# Patient Record
Sex: FEMALE | Race: WHITE | NOT HISPANIC OR LATINO | Employment: FULL TIME | URBAN - METROPOLITAN AREA
[De-identification: names, ages, dates, MRNs, and addresses within clinical notes are randomized per-mention and may not be internally consistent; named-entity substitution may affect disease eponyms.]

---

## 2017-07-03 ENCOUNTER — TELEPHONE (OUTPATIENT)
Dept: OBSTETRICS AND GYNECOLOGY | Facility: CLINIC | Age: 42
End: 2017-07-03

## 2017-07-03 ENCOUNTER — LAB VISIT (OUTPATIENT)
Dept: LAB | Facility: OTHER | Age: 42
End: 2017-07-03
Attending: OBSTETRICS & GYNECOLOGY
Payer: COMMERCIAL

## 2017-07-03 ENCOUNTER — PATIENT MESSAGE (OUTPATIENT)
Dept: OBSTETRICS AND GYNECOLOGY | Facility: CLINIC | Age: 42
End: 2017-07-03

## 2017-07-03 DIAGNOSIS — O20.0 THREATENED MISCARRIAGE: ICD-10-CM

## 2017-07-03 DIAGNOSIS — O02.1 MISSED AB: Primary | ICD-10-CM

## 2017-07-03 DIAGNOSIS — O20.0 THREATENED ABORTION: Primary | ICD-10-CM

## 2017-07-03 LAB
ABO + RH BLD: NORMAL
BLD GP AB SCN CELLS X3 SERPL QL: NORMAL
HCG INTACT+B SERPL-ACNC: 95 MIU/ML

## 2017-07-03 PROCEDURE — 84702 CHORIONIC GONADOTROPIN TEST: CPT

## 2017-07-03 PROCEDURE — 86850 RBC ANTIBODY SCREEN: CPT

## 2017-07-03 PROCEDURE — 86900 BLOOD TYPING SEROLOGIC ABO: CPT

## 2017-07-03 NOTE — TELEPHONE ENCOUNTER
Patient was instructed to come in to have blood work done. To check her HCG levels and a type in screen. I advised patient to come in early as possible patient stated that she will come in at 12 noon today. Patient verbalized understanding.

## 2017-07-03 NOTE — TELEPHONE ENCOUNTER
----- Message from Chanel Martell sent at 7/3/2017 11:05 AM CDT -----  Contact: Patient  X _1st Request  _  2nd Request  _  3rd Request    Who:CHRISTINA COSTA [[id number]]    Why:Patient states she had a blood work done and needed to know what was her next step     What Number to Call Back:[phone]    When to Expect a call back: (Before the end of the day)   -- if call after 3:00 call back will be tomorrow.

## 2017-07-03 NOTE — TELEPHONE ENCOUNTER
Called patient to discuss bleeding and HCG 95.  Patient is concerned about ectopic because of her history of endometriosis. Will get repeat lab on Wednesday and have her seen in clinic. She denies severe abdominal pain or heavy bleeding.

## 2017-07-03 NOTE — TELEPHONE ENCOUNTER
----- Message from Nat Velázquez sent at 7/3/2017  8:10 AM CDT -----  Contact: Self  Pt is calling in regards of some heavy bleeding that she is having. The pt states that she took a preg test and it came back positive. LMP 05/29/2017. The pt states that she started having some spotting on Saturday and then yesterday and this morning the bleeding got heavier with some cramping and back pain. The pt can be reached at [phone]. Thanks kG

## 2017-07-03 NOTE — TELEPHONE ENCOUNTER
Patient advised to come Wednesday to do blood work also patient scheduled to see the NP on Wednesday 07/05/17 patient verbalized.

## 2017-07-05 ENCOUNTER — OFFICE VISIT (OUTPATIENT)
Dept: OBSTETRICS AND GYNECOLOGY | Facility: CLINIC | Age: 42
End: 2017-07-05
Payer: COMMERCIAL

## 2017-07-05 ENCOUNTER — LAB VISIT (OUTPATIENT)
Dept: LAB | Facility: OTHER | Age: 42
End: 2017-07-05
Attending: OBSTETRICS & GYNECOLOGY
Payer: COMMERCIAL

## 2017-07-05 VITALS
BODY MASS INDEX: 19.76 KG/M2 | DIASTOLIC BLOOD PRESSURE: 56 MMHG | HEIGHT: 65 IN | SYSTOLIC BLOOD PRESSURE: 92 MMHG | WEIGHT: 118.63 LBS

## 2017-07-05 DIAGNOSIS — O02.1 MISSED AB: Primary | ICD-10-CM

## 2017-07-05 DIAGNOSIS — O20.0 THREATENED ABORTION: ICD-10-CM

## 2017-07-05 LAB — HCG INTACT+B SERPL-ACNC: 35 MIU/ML

## 2017-07-05 PROCEDURE — 99999 PR PBB SHADOW E&M-EST. PATIENT-LVL III: CPT | Mod: PBBFAC,,, | Performed by: NURSE PRACTITIONER

## 2017-07-05 PROCEDURE — 99213 OFFICE O/P EST LOW 20 MIN: CPT | Mod: S$GLB,,, | Performed by: NURSE PRACTITIONER

## 2017-07-05 PROCEDURE — 84702 CHORIONIC GONADOTROPIN TEST: CPT

## 2017-07-05 PROCEDURE — 36415 COLL VENOUS BLD VENIPUNCTURE: CPT

## 2017-07-05 NOTE — PROGRESS NOTES
CC: Missed     HPI: Pt is a 42 y.o.  female who presents as a f/o for threatened AB.  She has had vaginal bleeding for the past 5 days.  Reports VB ranges from spotting to heavy flow.  Reports some associated low back pain and mild cramping.  Denies any fever, sharp abdominal pains, or foul smelling vaginal discharge.  Reports h/o endometriosis and septate uterus which was repaired.   HCG 2 days ago was 95- today the hcg dropped to 35.  ROS:  GENERAL: Feeling well overall. Denies fever or chills.   SKIN: Denies rash or lesions.   HEAD: Denies head injury or headache.   NODES: Denies enlarged lymph nodes.   CHEST: Denies chest pain or shortness of breath.   CARDIOVASCULAR: Denies palpitations or left sided chest pain.   ABDOMEN: No abdominal pain, constipation, diarrhea, nausea, vomiting or rectal bleeding.   URINARY: No dysuria, hematuria, or burning on urination.  REPRODUCTIVE: See HPI.   BREASTS: Denies pain, lumps, or nipple discharge.   HEMATOLOGIC: No easy bruisability or excessive bleeding.   MUSCULOSKELETAL: Denies joint pain or swelling.   NEUROLOGIC: Denies syncope or weakness.   PSYCHIATRIC: Denies depression, anxiety or mood swings.    PE:   APPEARANCE: Well nourished, well developed, Black or  female in no acute distress.  VULVA: No lesions. Normal external female genitalia.  URETHRAL MEATUS: Normal size and location, no lesions, no prolapse.  URETHRA: No masses, tenderness, or prolapse.  VAGINA: Moist. No lesions or lacerations noted. No abnormal discharge present. No odor present.   CERVIX: No lesions or discharge. No cervical motion tenderness.   UTERUS: Normal size, regular shape, mobile, non-tender.  ADNEXA: No tenderness. No fullness or masses palpated in the adnexal regions.   ANUS PERINEUM: Normal.      Diagnosis:  1. Missed         Plan:     Orders Placed This Encounter    hCG, quantitative      Follow up hCG weekly until negative  Discussed to call and RTC if   Has a Fever above 100.4°F or chills, bright red vaginal bleeding that soaks more than 1 pad per hour, or a foul smelling discharge  Discussed Naproxen PRN discomfort.  Discussed the drop in hCG with the VB is consistent with a missed AB  ER precautions reviewed      Cherry Acevedo, FNP-C

## 2017-07-20 ENCOUNTER — LAB VISIT (OUTPATIENT)
Dept: LAB | Facility: OTHER | Age: 42
End: 2017-07-20
Attending: OBSTETRICS & GYNECOLOGY
Payer: COMMERCIAL

## 2017-07-20 DIAGNOSIS — O02.1 MISSED AB: ICD-10-CM

## 2017-07-20 LAB — HCG INTACT+B SERPL-ACNC: <1.2 MIU/ML

## 2017-07-20 PROCEDURE — 84702 CHORIONIC GONADOTROPIN TEST: CPT

## 2017-07-20 PROCEDURE — 36415 COLL VENOUS BLD VENIPUNCTURE: CPT

## 2017-10-16 ENCOUNTER — TELEPHONE (OUTPATIENT)
Dept: OBSTETRICS AND GYNECOLOGY | Facility: CLINIC | Age: 42
End: 2017-10-16

## 2017-10-16 DIAGNOSIS — Z12.31 ENCOUNTER FOR SCREENING MAMMOGRAM FOR MALIGNANT NEOPLASM OF BREAST: Primary | ICD-10-CM

## 2017-10-16 NOTE — TELEPHONE ENCOUNTER
----- Message from Megan Kenny sent at 10/16/2017 12:19 PM CDT -----  Contact: pt  X_  1st Request  _  2nd Request  _  3rd Request      Who:CHRISTINA COSTA [[id number]]    Why: Patient states she would like to get her MMG orders in     What Number to Call Back:[phone]       When to Expect a call back: (Before the end of the day)   -- if call after 3:00 call back will be tomorrow.

## 2017-11-01 ENCOUNTER — OFFICE VISIT (OUTPATIENT)
Dept: DERMATOLOGY | Facility: CLINIC | Age: 42
End: 2017-11-01
Payer: COMMERCIAL

## 2017-11-01 ENCOUNTER — OFFICE VISIT (OUTPATIENT)
Dept: OBSTETRICS AND GYNECOLOGY | Facility: CLINIC | Age: 42
End: 2017-11-01
Attending: OBSTETRICS & GYNECOLOGY
Payer: COMMERCIAL

## 2017-11-01 VITALS — BODY MASS INDEX: 20.47 KG/M2 | WEIGHT: 123 LBS

## 2017-11-01 VITALS
SYSTOLIC BLOOD PRESSURE: 100 MMHG | BODY MASS INDEX: 20.64 KG/M2 | HEIGHT: 65 IN | WEIGHT: 123.88 LBS | DIASTOLIC BLOOD PRESSURE: 60 MMHG

## 2017-11-01 DIAGNOSIS — Z01.419 ENCOUNTER FOR GYNECOLOGICAL EXAMINATION WITHOUT ABNORMAL FINDING: Primary | ICD-10-CM

## 2017-11-01 DIAGNOSIS — D22.9 NEVUS OF MULTIPLE SITES: Primary | ICD-10-CM

## 2017-11-01 DIAGNOSIS — Z78.9 DIET CONTAINS INADEQUATE AMOUNTS OF FOOD: ICD-10-CM

## 2017-11-01 DIAGNOSIS — L73.8 SEBACEOUS GLAND HYPERPLASIA: ICD-10-CM

## 2017-11-01 DIAGNOSIS — D18.00 ANGIOMA: ICD-10-CM

## 2017-11-01 DIAGNOSIS — Z78.9 ADEQUATE NUTRITION: ICD-10-CM

## 2017-11-01 DIAGNOSIS — L73.9 FOLLICULITIS: ICD-10-CM

## 2017-11-01 PROCEDURE — 99999 PR PBB SHADOW E&M-EST. PATIENT-LVL III: CPT | Mod: PBBFAC,,, | Performed by: DERMATOLOGY

## 2017-11-01 PROCEDURE — 99213 OFFICE O/P EST LOW 20 MIN: CPT | Mod: S$GLB,,, | Performed by: DERMATOLOGY

## 2017-11-01 PROCEDURE — 99396 PREV VISIT EST AGE 40-64: CPT | Mod: S$GLB,,, | Performed by: OBSTETRICS & GYNECOLOGY

## 2017-11-01 NOTE — PROGRESS NOTES
"SUBJECTIVE:   42 y.o. female   for annual routine Pap and checkup. Patient's last menstrual period was 10/17/2017..  She reports worsening period cramps. She stopped OCPs- made her haq. She also reports tail bone pain- "feels like bad brusie" since earlier this year. It is worse with sitting. She denies consitipation. She is tearful today- had a miscarriage in July.         Past Medical History:   Diagnosis Date    Endometriosis of pelvis 2005     Past Surgical History:   Procedure Laterality Date    uterus septate  2005     Social History     Social History    Marital status:      Spouse name: N/A    Number of children: N/A    Years of education: N/A     Occupational History    Not on file.     Social History Main Topics    Smoking status: Never Smoker    Smokeless tobacco: Never Used    Alcohol use Yes      Comment: occasionally, 2-3 per week    Drug use: No    Sexual activity: Yes     Partners: Male     Birth control/ protection: Condom     Other Topics Concern    Are You Pregnant Or Think You May Be? No    Breast-Feeding No     Social History Narrative    No narrative on file     Family History   Problem Relation Age of Onset    Breast cancer Paternal Grandmother 80    Melanoma Paternal Grandmother     Melanoma Father     Melanoma Sister     Melanoma Paternal Grandfather     Ovarian cancer Neg Hx      OB History    Para Term  AB Living   3 2 2     2   SAB TAB Ectopic Multiple Live Births                  # Outcome Date GA Lbr Alejandro/2nd Weight Sex Delivery Anes PTL Lv   3             2 Term            1 Term                     Current Outpatient Prescriptions   Medication Sig Dispense Refill    desog-e.estradiol/e.estradiol (KARIVA) 0.15-0.02 mgx21 /0.01 mg x 5 per tablet Take 1 tablet by mouth once daily. 90 tablet 3    acyclovir (ZOVIRAX) 400 MG tablet 1 po TID  For 3-5 days prn fever blisters 60 tablet prn    norethindrone-ethinyl estradiol-iron " (LOESTRIN FE 1.5/30, 28-DAY,) 1.5 mg-30 mcg (21)/75 mg (7) tablet Take 1 tablet by mouth once daily. 90 tablet 0     No current facility-administered medications for this visit.      Allergies: Patient has no known allergies.     ROS:  Constitutional: no weight loss, weight gain, fever, fatigue  Eyes:  No vision changes, glasses/contacts  ENT/Mouth: No ulcers, sinus problems, ears ringing, headache  Cardiovascular: No inability to lie flat, chest pain, exercise intolerance, swelling, heart palpitations  Respiratory: No wheezing, coughing blood, shortness of breath, or cough  Gastrointestinal: No diarrhea, bloody stool, nausea/vomiting, constipation, gas, hemorrhoids  Genitourinary: No blood in urine, painful urination, urgency of urination, frequency of urination, incomplete emptying, incontinence, abnormal bleeding, painful periods, heavy periods, vaginal discharge, vaginal odor, painful intercourse, sexual problems, bleeding after intercourse.  Musculoskeletal: No muscle weakness  Skin/Breast: No painful breasts, nipple discharge, masses, rash, ulcers  Neurological: No passing out, seizures, numbness, headache  Endocrine: No diabetes, hypothyroid, hyperthyroid, hot flashes, hair loss, abnormal hair growth, acne  Psychiatric: No depression, crying  Hematologic: No bruises, bleeding, swollen lymph nodes, anemia.      Physical Exam:   Constitutional: She is oriented to person, place, and time. She appears well-developed and well-nourished.      Neck: Normal range of motion. No tracheal deviation present. No thyromegaly present.    Cardiovascular: Exam reveals no edema.     Pulmonary/Chest: Effort normal. She exhibits no mass, no tenderness, no deformity and no retraction. Right breast exhibits no inverted nipple, no mass, no nipple discharge, no skin change, no tenderness, presence, no bleeding and no swelling. Left breast exhibits no inverted nipple, no mass, no nipple discharge, no skin change, no tenderness,  presence, no bleeding and no swelling. Breasts are symmetrical.        Abdominal: Soft. She exhibits no distension and no mass. There is no tenderness. There is no rebound and no guarding. No hernia. Hernia confirmed negative in the left inguinal area.     Genitourinary: Vagina normal and uterus normal. Rectal exam shows no external hemorrhoid. There is no rash, tenderness or lesion on the right labia. There is no rash, tenderness or lesion on the left labia. Uterus is not deviated. Cervix is normal. No no adexnal prolapse. Right adnexum displays no mass, no tenderness and no fullness. Left adnexum displays no mass, no tenderness and no fullness. No tenderness, bleeding, rectocele, cystocele or unspecified prolapse of vaginal walls in the vagina. No vaginal discharge found. Cervix exhibits no motion tenderness, no discharge and no friability.           Musculoskeletal: Normal range of motion and moves all extremeties. She exhibits no edema.      Lymphadenopathy:        Right: No inguinal adenopathy present.        Left: No inguinal adenopathy present.    Neurological: She is alert and oriented to person, place, and time.    Skin: No rash noted. No erythema. No pallor.    Psychiatric: She has a normal mood and affect. Her behavior is normal. Judgment and thought content normal.         ASSESSMENT:   well woman    PLAN:   mammogram    return annually or prn

## 2017-11-01 NOTE — PROGRESS NOTES
Subjective:       Patient ID:  Zara Leal is a 42 y.o. female who presents for   Chief Complaint   Patient presents with    Lesion     nose     History of Present Illness: The patient presents with chief complaint of lesion in nose.  Location: nose   Duration: a year  Signs/Symptoms: sore to touch    Prior treatments: none    Also would like mole check.           Review of Systems   Constitutional: Negative for fever.   Skin: Negative for itching and rash.   Hematologic/Lymphatic: Does not bruise/bleed easily.        Objective:    Physical Exam   Constitutional: She appears well-developed and well-nourished. No distress.   Neurological: She is alert and oriented to person, place, and time. She is not disoriented.   Psychiatric: She has a normal mood and affect.   Skin:   Areas Examined (abnormalities noted in diagram):   Head / Face Inspection Performed  Neck Inspection Performed  Chest / Axilla Inspection Performed  Abdomen Inspection Performed  Back Inspection Performed  RUE Inspected  LUE Inspection Performed                   Diagram Legend     Erythematous scaling macule/papule c/w actinic keratosis       Vascular papule c/w angioma      Pigmented verrucoid papule/plaque c/w seborrheic keratosis      Yellow umbilicated papule c/w sebaceous hyperplasia      Irregularly shaped tan macule c/w lentigo     1-2 mm smooth white papules consistent with Milia      Movable subcutaneous cyst with punctum c/w epidermal inclusion cyst      Subcutaneous movable cyst c/w pilar cyst      Firm pink to brown papule c/w dermatofibroma      Pedunculated fleshy papule(s) c/w skin tag(s)      Evenly pigmented macule c/w junctional nevus     Mildly variegated pigmented, slightly irregular-bordered macule c/w mildly atypical nevus      Flesh colored to evenly pigmented papule c/w intradermal nevus       Pink pearly papule/plaque c/w basal cell carcinoma      Erythematous hyperkeratotic cursted plaque c/w SCC       "Surgical scar with no sign of skin cancer recurrence      Open and closed comedones      Inflammatory papules and pustules      Verrucoid papule consistent consistent with wart     Erythematous eczematous patches and plaques     Dystrophic onycholytic nail with subungual debris c/w onychomycosis     Umbilicated papule    Erythematous-base heme-crusted tan verrucoid plaque consistent with inflamed seborrheic keratosis     Erythematous Silvery Scaling Plaque c/w Psoriasis     See annotation      Assessment / Plan:        Nevus of multiple sites  The "ABCD" rules to observe pigmented lesions were reviewed.  Brochure provided    Angiomas  reassurance  Brochure provided  Can have laser removal if desired    Folliculitis left nostril    mupirocin calcium 2% nasl oint (BACTROBAN NASAL) 2 % Oint; by Nasal route 2 (two) times daily.  Dispense: 1 g; Refill: 3  Refer for opinion ENT    Sebaceous gland hyperplasia  reassurance      -                Return in about 1 year (around 11/1/2018).  "

## 2017-12-13 ENCOUNTER — TELEPHONE (OUTPATIENT)
Dept: OBSTETRICS AND GYNECOLOGY | Facility: CLINIC | Age: 42
End: 2017-12-13

## 2017-12-13 NOTE — TELEPHONE ENCOUNTER
----- Message from Iveth Wong sent at 12/13/2017  1:14 PM CST -----  Contact: self  x_  1st Request  _  2nd Request  _  3rd Request    Who: pt    Why: pt needs mammo ordered and scheduled.. Please advise    What Number to Call Back: [phone]    When to Expect a call back: (Before the end of the day)   -- if call after 3:00 call back will be tomorrow.

## 2018-02-11 ENCOUNTER — TELEPHONE (OUTPATIENT)
Dept: FAMILY MEDICINE | Facility: CLINIC | Age: 43
End: 2018-02-11

## 2018-02-11 RX ORDER — OSELTAMIVIR PHOSPHATE 75 MG/1
75 CAPSULE ORAL DAILY
Qty: 10 CAPSULE | Refills: 0 | Status: SHIPPED | OUTPATIENT
Start: 2018-02-11 | End: 2018-02-21

## 2018-02-11 RX ORDER — OSELTAMIVIR PHOSPHATE 75 MG/1
75 CAPSULE ORAL DAILY
Qty: 10 CAPSULE | Refills: 0 | Status: SHIPPED | OUTPATIENT
Start: 2018-02-11 | End: 2018-02-11 | Stop reason: SDUPTHER

## 2018-03-20 ENCOUNTER — OFFICE VISIT (OUTPATIENT)
Dept: SPORTS MEDICINE | Facility: CLINIC | Age: 43
End: 2018-03-20
Payer: COMMERCIAL

## 2018-03-20 ENCOUNTER — HOSPITAL ENCOUNTER (OUTPATIENT)
Dept: RADIOLOGY | Facility: HOSPITAL | Age: 43
Discharge: HOME | End: 2018-03-20
Attending: ORTHOPAEDIC SURGERY
Payer: COMMERCIAL

## 2018-03-20 VITALS
DIASTOLIC BLOOD PRESSURE: 66 MMHG | WEIGHT: 123 LBS | HEIGHT: 65 IN | BODY MASS INDEX: 20.49 KG/M2 | SYSTOLIC BLOOD PRESSURE: 105 MMHG | HEART RATE: 82 BPM

## 2018-03-20 DIAGNOSIS — M25.552 BILATERAL HIP PAIN: ICD-10-CM

## 2018-03-20 DIAGNOSIS — M25.512 LEFT SHOULDER PAIN, UNSPECIFIED CHRONICITY: Primary | ICD-10-CM

## 2018-03-20 DIAGNOSIS — R00.2 PALPITATIONS: ICD-10-CM

## 2018-03-20 DIAGNOSIS — M25.512 LEFT SHOULDER PAIN, UNSPECIFIED CHRONICITY: ICD-10-CM

## 2018-03-20 DIAGNOSIS — M25.551 BILATERAL HIP PAIN: ICD-10-CM

## 2018-03-20 PROCEDURE — 73030 X-RAY EXAM OF SHOULDER: CPT | Mod: TC,FY,PO,LT

## 2018-03-20 PROCEDURE — 73030 X-RAY EXAM OF SHOULDER: CPT | Mod: 26,LT,, | Performed by: RADIOLOGY

## 2018-03-20 PROCEDURE — 99999 PR PBB SHADOW E&M-EST. PATIENT-LVL III: CPT | Mod: PBBFAC,,, | Performed by: ORTHOPAEDIC SURGERY

## 2018-03-20 PROCEDURE — 99203 OFFICE O/P NEW LOW 30 MIN: CPT | Mod: S$GLB,,, | Performed by: ORTHOPAEDIC SURGERY

## 2018-03-20 NOTE — PROGRESS NOTES
CC: LEFT shoulder pain      42 y.o. Female with a history of left shoulder pain since beginning of February 2018.  She reports waking up to neck and shoulder pain.  Reports pain with resisted internal rotation. Reports popping with certain movements.  She states that the pain is severe and not responding to any conservative care.      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    She reports that the pain and weakness is worse with overhead activity. It also bothers her at night.    Is affecting ADLs.  Pain is 4/10 at it's worst.      Past Medical History:   Diagnosis Date    Endometriosis of pelvis 12/2005       Past Surgical History:   Procedure Laterality Date    uterus septate  12/2005       Family History   Problem Relation Age of Onset    Breast cancer Paternal Grandmother 80    Melanoma Paternal Grandmother     Melanoma Father     Melanoma Sister     Melanoma Paternal Grandfather     Ovarian cancer Neg Hx          Current Outpatient Prescriptions:     acyclovir (ZOVIRAX) 400 MG tablet, 1 po TID  For 3-5 days prn fever blisters, Disp: 60 tablet, Rfl: prn    mupirocin calcium 2% nasl oint (BACTROBAN NASAL) 2 % Oint, by Nasal route 2 (two) times daily., Disp: 1 g, Rfl: 3    desog-e.estradiol/e.estradiol (KARIVA) 0.15-0.02 mgx21 /0.01 mg x 5 per tablet, Take 1 tablet by mouth once daily., Disp: 90 tablet, Rfl: 3    norethindrone-ethinyl estradiol-iron (LOESTRIN FE 1.5/30, 28-DAY,) 1.5 mg-30 mcg (21)/75 mg (7) tablet, Take 1 tablet by mouth once daily., Disp: 90 tablet, Rfl: 0    Review of patient's allergies indicates:  No Known Allergies       REVIEW OF SYSTEMS:  Constitution: Negative. Negative for chills, fever and night sweats.   HENT: Negative for congestion and headaches.    Eyes: Negative for blurred vision, left vision loss and right vision loss.   Cardiovascular: Negative for chest pain and syncope.   Respiratory: Negative for cough and shortness of breath.    Endocrine: Negative for polydipsia,  "polyphagia and polyuria.   Hematologic/Lymphatic: Negative for bleeding problem. Does not bruise/bleed easily.   Skin: Negative for dry skin, itching and rash.   Musculoskeletal: Negative for falls.  Positive for left shoulder pain and muscle weakness.   Gastrointestinal: Negative for abdominal pain and bowel incontinence.   Genitourinary: Negative for bladder incontinence and nocturia.   Neurological: Negative for disturbances in coordination, loss of balance and seizures.   Psychiatric/Behavioral: Negative for depression. The patient does not have insomnia.    Allergic/Immunologic: Negative for hives and persistent infections.      PHYSICAL EXAMINATION:  Vitals:  /66   Pulse 82   Ht 5' 5" (1.651 m)   Wt 55.8 kg (123 lb)   BMI 20.47 kg/m²    General: The patient is alert and oriented x 3.  Mood is pleasant.  Observation of ears, eyes and nose reveal no gross abnormalities.  No labored breathing observed.  Gait is coordinated. Patient can toe walk and heel walk without difficulty.      LEFT Shoulder / Upper Extremity Exam    OBSERVATION:     Swelling  none  Deformity  none   Discoloration  none   Scapular winging none   Scars   none  Atrophy  none    TENDERNESS / CREPITUS (T/C):          T/C      T/C   Clavicle   -/-  SUPRAspinatus    -/-     AC Jt.    -/-  INFRAspinatus  -/-    SC Jt.    -/-  Deltoid    -/-      G. Tuberosity  -/-  LH BICEP groove  +/-   Acromion:  -/-  Midline Neck   -/-     Scapular Spine -/-  Trapezium   -/-   SMA Scapula  -/-  GH jt. line - post  +/-     Scapulothoracic  -/-         ROM: (* = with pain)  Right shoulder   Left shoulder        AROM (PROM)   AROM (PROM)   FE    170° (175°)     170° (175°)  *   ER at 90° ABD  90°  (90°)    85°  (90°) *   IR (spine level)   T10     BP *    STRENGTH: (* = with pain) Right shoulder   Left shoulder    SCAPTION   5/5    4+/5    IR    5/5    4+/5   ER    5/5    4+/5   BICEPS   5/5    4+/5   Deltoid    5/5    5/5     SIGNS:  Painful " side       NEER   +    OKELLIS  neg    BOGGS   +    SPEEDS  neg     DROP ARM   -   BELLY PRESS neg   Superior escape none    LIFT-OFF  neg   X-Body ADD    neg    MOVING VALGUS neg        STABILITY TESTING    Right shoulder   Left shoulder    Translation     Anterior  up face     up face    Posterior  up face    up face    Sulcus   < 10mm    < 10 mm     Signs   Apprehension   neg      neg       Relocation   no change     no change      Jerk test  neg     neg    EXTREMITY NEURO-VASCULAR EXAM:    Sensation grossly intact to light touch all dermatomal regions.    DTR 2+ Biceps, Triceps, BR and Negative Donovans sign   Grossly intact motor function at Elbow, Wrist and Hand   Distal pulses radial and ulnar 2+, brisk cap refill, symmetric.      NECK:  Painless FROM and spinous processes non-tender. Negative Spurlings sign.      OTHER FINDINGS:  + scapular dyskinesia    XRAYS (3/20/18): Visualized osseous structures appear unremarkable, with no definite evidence of recent or healing fracture, lytic destructive process, appreciable arthritic change, or other significant abnormality identified.  No glenohumeral dislocation.  No abnormal soft tissue calcifications.      ASSESSMENT:    Left shoulder pain  Bilateral hip pain    PLAN:    1. Physical therapy for left shoulder and bilateral hip pain  2. Follow up 2 months    All questions were answered, patient will contact us for questions or concerns in the interim.

## 2018-05-02 PROBLEM — R53.1 WEAKNESS: Status: ACTIVE | Noted: 2018-05-02

## 2018-05-02 PROBLEM — R29.3 POSTURE IMBALANCE: Status: ACTIVE | Noted: 2018-05-02

## 2018-08-16 ENCOUNTER — TELEPHONE (OUTPATIENT)
Dept: FAMILY MEDICINE | Facility: CLINIC | Age: 43
End: 2018-08-16

## 2018-08-16 DIAGNOSIS — R00.2 PALPITATIONS: Primary | ICD-10-CM

## 2018-08-16 PROCEDURE — 93010 ELECTROCARDIOGRAM REPORT: CPT | Mod: S$GLB,,, | Performed by: INTERNAL MEDICINE

## 2018-08-16 PROCEDURE — 93005 ELECTROCARDIOGRAM TRACING: CPT | Mod: S$GLB,,, | Performed by: INTERNAL MEDICINE

## 2018-08-16 RX ORDER — PROPRANOLOL HYDROCHLORIDE 10 MG/1
10 TABLET ORAL EVERY 6 HOURS PRN
Qty: 90 TABLET | Refills: 11 | Status: SHIPPED | OUTPATIENT
Start: 2018-08-16 | End: 2019-08-14 | Stop reason: ALTCHOICE

## 2018-08-16 NOTE — TELEPHONE ENCOUNTER
Telephone call.  Patient has had returned palpitations like she had in 2014.  She has been under stress lately.  It was worse in the past when slightly dehydrated her tired.  She has cut out caffeine.  Shock with sometimes aggravates it.  It has been gone going a few weeks.  She feels her heart rate increases and she gets more of it when she minimally exerts herself around the house.  Yesterday and today she awoke with it that is had some slight tightness in her chest.  No shortness of breath.  Obviously moderately anxious.    Reviewed echo which was normal.  Prior Holter monitor revealed only PVCs and she was seen by EP Cardiology reassured her about the benign nature of her symptoms.  She was offered beta-blocker.  We did discuss probability of being good to have low-dose Inderal to take as needed or possibly even take on a regular basis when symptomatic.    I will have her come in today for EKG.

## 2018-11-20 RX ORDER — ACYCLOVIR 400 MG/1
TABLET ORAL
Qty: 60 TABLET | Refills: 12 | Status: SHIPPED | OUTPATIENT
Start: 2018-11-20 | End: 2021-11-03 | Stop reason: SDUPTHER

## 2019-02-11 RX ORDER — OSELTAMIVIR PHOSPHATE 75 MG/1
75 CAPSULE ORAL DAILY
Qty: 10 CAPSULE | Refills: 0 | Status: SHIPPED | OUTPATIENT
Start: 2019-02-11 | End: 2019-02-21

## 2019-04-04 ENCOUNTER — TELEPHONE (OUTPATIENT)
Dept: FAMILY MEDICINE | Facility: CLINIC | Age: 44
End: 2019-04-04

## 2019-04-04 RX ORDER — VALACYCLOVIR HYDROCHLORIDE 1 G/1
1000 TABLET, FILM COATED ORAL 3 TIMES DAILY
Qty: 21 TABLET | Refills: 0 | Status: SHIPPED | OUTPATIENT
Start: 2019-04-04 | End: 2019-08-14 | Stop reason: ALTCHOICE

## 2019-04-04 NOTE — TELEPHONE ENCOUNTER
She is in [place] taking a class and has been under lot of stress.  She developed a burning sensation in her right abdomen.  Sensitive to touch with close.  She has hima a picture and there is no rash her changes or redness as of yet.  She does have acyclovir for fever blisters.  We discussed the possibility of it being shingles as it does appear to be in a potential dermatomal pattern and has the right nerve type symptoms.  She will take 2 of her acyclovir for 0s today maybe 3 times per day and I will send her in the 7 day course of Valtrex

## 2019-06-14 ENCOUNTER — TELEPHONE (OUTPATIENT)
Dept: OBSTETRICS AND GYNECOLOGY | Facility: CLINIC | Age: 44
End: 2019-06-14

## 2019-06-14 DIAGNOSIS — Z12.31 ENCOUNTER FOR SCREENING MAMMOGRAM FOR MALIGNANT NEOPLASM OF BREAST: Primary | ICD-10-CM

## 2019-06-14 NOTE — TELEPHONE ENCOUNTER
----- Message from Aure Lehman sent at 6/14/2019  9:20 AM CDT -----  Contact: Self            Name of Who is Calling: CHRISTINA COSTA [[id number]]      What is the request in detail: Pt states she needs an order for a mammogram placed in the system. Please contact to further discuss and advise.        Can the clinic reply by [facility]: N      What Number to Call Back [facility] not [facility]: [phone]

## 2019-06-14 NOTE — TELEPHONE ENCOUNTER
Pt called wanting to schedule mmg. Pt states she she has not been in for a while. Pt last seen 11/2017. Advised pt will schedule mmg after annual appointment. Moved pt's appointment from 10/2019 to 8/14/19

## 2019-08-14 ENCOUNTER — OFFICE VISIT (OUTPATIENT)
Dept: OBSTETRICS AND GYNECOLOGY | Facility: CLINIC | Age: 44
End: 2019-08-14
Attending: OBSTETRICS & GYNECOLOGY
Payer: COMMERCIAL

## 2019-08-14 ENCOUNTER — HOSPITAL ENCOUNTER (OUTPATIENT)
Dept: RADIOLOGY | Facility: OTHER | Age: 44
Discharge: HOME | End: 2019-08-14
Attending: OBSTETRICS & GYNECOLOGY
Payer: COMMERCIAL

## 2019-08-14 VITALS
WEIGHT: 126.31 LBS | SYSTOLIC BLOOD PRESSURE: 90 MMHG | HEIGHT: 65 IN | DIASTOLIC BLOOD PRESSURE: 56 MMHG | BODY MASS INDEX: 21.04 KG/M2

## 2019-08-14 DIAGNOSIS — G89.29 CHRONIC PELVIC PAIN IN FEMALE: ICD-10-CM

## 2019-08-14 DIAGNOSIS — R10.2 CHRONIC PELVIC PAIN IN FEMALE: Primary | ICD-10-CM

## 2019-08-14 DIAGNOSIS — G89.29 CHRONIC PELVIC PAIN IN FEMALE: Primary | ICD-10-CM

## 2019-08-14 DIAGNOSIS — Z12.4 SCREENING FOR MALIGNANT NEOPLASM OF CERVIX: ICD-10-CM

## 2019-08-14 DIAGNOSIS — R10.2 CHRONIC PELVIC PAIN IN FEMALE: ICD-10-CM

## 2019-08-14 DIAGNOSIS — Z01.419 ENCOUNTER FOR GYNECOLOGICAL EXAMINATION WITHOUT ABNORMAL FINDING: ICD-10-CM

## 2019-08-14 DIAGNOSIS — Z12.31 ENCOUNTER FOR SCREENING MAMMOGRAM FOR MALIGNANT NEOPLASM OF BREAST: ICD-10-CM

## 2019-08-14 PROCEDURE — 76856 US EXAM PELVIC COMPLETE: CPT | Mod: 26,,, | Performed by: RADIOLOGY

## 2019-08-14 PROCEDURE — 76856 US PELVIS COMP WITH TRANSVAG NON-OB (XPD): ICD-10-PCS | Mod: 26,,, | Performed by: RADIOLOGY

## 2019-08-14 PROCEDURE — 76830 US PELVIS COMP WITH TRANSVAG NON-OB (XPD): ICD-10-PCS | Mod: 26,,, | Performed by: RADIOLOGY

## 2019-08-14 PROCEDURE — 77067 SCR MAMMO BI INCL CAD: CPT | Mod: 26,,, | Performed by: RADIOLOGY

## 2019-08-14 PROCEDURE — 76830 TRANSVAGINAL US NON-OB: CPT | Mod: 26,,, | Performed by: RADIOLOGY

## 2019-08-14 PROCEDURE — 76830 TRANSVAGINAL US NON-OB: CPT | Mod: TC

## 2019-08-14 PROCEDURE — 77067 MAMMO DIGITAL SCREENING BILAT WITH TOMOSYNTHESIS_CAD: ICD-10-PCS | Mod: 26,,, | Performed by: RADIOLOGY

## 2019-08-14 PROCEDURE — 88175 CYTOPATH C/V AUTO FLUID REDO: CPT

## 2019-08-14 PROCEDURE — 77067 SCR MAMMO BI INCL CAD: CPT | Mod: TC

## 2019-08-14 PROCEDURE — 87624 HPV HI-RISK TYP POOLED RSLT: CPT

## 2019-08-14 PROCEDURE — 99396 PR PREVENTIVE VISIT,EST,40-64: ICD-10-PCS | Mod: S$GLB,,, | Performed by: OBSTETRICS & GYNECOLOGY

## 2019-08-14 PROCEDURE — 99396 PREV VISIT EST AGE 40-64: CPT | Mod: S$GLB,,, | Performed by: OBSTETRICS & GYNECOLOGY

## 2019-08-14 PROCEDURE — 77063 MAMMO DIGITAL SCREENING BILAT WITH TOMOSYNTHESIS_CAD: ICD-10-PCS | Mod: 26,,, | Performed by: RADIOLOGY

## 2019-08-14 PROCEDURE — 77063 BREAST TOMOSYNTHESIS BI: CPT | Mod: 26,,, | Performed by: RADIOLOGY

## 2019-08-14 RX ORDER — IBUPROFEN AND FAMOTIDINE 26.6; 8 MG/1; MG/1
TABLET ORAL
COMMUNITY
End: 2019-08-14 | Stop reason: ALTCHOICE

## 2019-08-14 NOTE — PROGRESS NOTES
SUBJECTIVE:   44 y.o. female   for annual routine Pap and checkup. Patient's last menstrual period was 2019..  She reports history of endometriosis.  She took OCPs in the past before having children and did well. The last time she took OCPs after the birth of 1 of her children she reports that made me crazy  She reports having pain occasionally every few days to 1 so weak.  She describes it as a twinge on the right side.  She is concerned that this may be her endometriosis coming back.    She reports fatigue midcycle    Past Medical History:   Diagnosis Date    Endometriosis of pelvis 2005     Past Surgical History:   Procedure Laterality Date    uterus septate  2005     Social History     Socioeconomic History    Marital status:      Spouse name: Not on file    Number of children: Not on file    Years of education: Not on file    Highest education level: Not on file   Occupational History    Not on file   Social Needs    Financial resource strain: Not on file    Food insecurity:     Worry: Not on file     Inability: Not on file    Transportation needs:     Medical: Not on file     Non-medical: Not on file   Tobacco Use    Smoking status: Never Smoker    Smokeless tobacco: Never Used   Substance and Sexual Activity    Alcohol use: Not Currently    Drug use: No    Sexual activity: Yes     Partners: Male     Birth control/protection: Condom   Lifestyle    Physical activity:     Days per week: Not on file     Minutes per session: Not on file    Stress: Not on file   Relationships    Social connections:     Talks on phone: Not on file     Gets together: Not on file     Attends Orthodoxy service: Not on file     Active member of club or organization: Not on file     Attends meetings of clubs or organizations: Not on file     Relationship status: Not on file   Other Topics Concern    Are you pregnant or think you may be? No    Breast-feeding No   Social History Narrative     Not on file     Family History   Problem Relation Age of Onset    Breast cancer Paternal Grandmother 80    Melanoma Paternal Grandmother     Melanoma Father     Melanoma Sister     Melanoma Paternal Grandfather     Ovarian cancer Neg Hx     Colon cancer Neg Hx      OB History    Para Term  AB Living   3 2 2   1 2   SAB TAB Ectopic Multiple Live Births   1              # Outcome Date GA Lbr Alejandro/2nd Weight Sex Delivery Anes PTL Lv   3 SAB            2 Term            1 Term                    Current Outpatient Medications   Medication Sig Dispense Refill    acyclovir (ZOVIRAX) 400 MG tablet 1 po TID  For 3-5 days prn fever blisters 60 tablet 12     No current facility-administered medications for this visit.      Allergies: Patient has no known allergies.     The ASCVD Risk score (Glover SAV Jr., et al., 2013) failed to calculate for the following reasons:    Cannot find a previous HDL lab    Cannot find a previous total cholesterol lab      ROS:  Constitutional: no weight loss, weight gain, fever, +fatigue  Eyes:  No vision changes, glasses/contacts  ENT/Mouth: No ulcers, sinus problems, ears ringing, headache  Cardiovascular: No inability to lie flat, chest pain, exercise intolerance, swelling, heart palpitations  Respiratory: No wheezing, coughing blood, shortness of breath, or cough  Gastrointestinal: No diarrhea, bloody stool, nausea/vomiting, constipation, gas, hemorrhoids  Genitourinary: No blood in urine, painful urination, urgency of urination, frequency of urination, incomplete emptying, incontinence, abnormal bleeding, +painful periods, heavy periods, vaginal discharge, vaginal odor, painful intercourse, sexual problems, bleeding after intercourse.  Musculoskeletal: No muscle weakness  Skin/Breast: No painful breasts, nipple discharge, masses, rash, ulcers  Neurological: No passing out, seizures, numbness, headache  Endocrine: No diabetes, hypothyroid, hyperthyroid, hot flashes, hair  loss, abnormal hair growth, acne  Psychiatric: No depression, crying  Hematologic: No bruises, bleeding, swollen lymph nodes, anemia.      Physical Exam:   Constitutional: She is oriented to person, place, and time. She appears well-developed and well-nourished.      Neck: Normal range of motion. No tracheal deviation present. No thyromegaly present.    Cardiovascular: Exam reveals no edema.     Pulmonary/Chest: Effort normal. She exhibits no mass, no tenderness, no deformity and no retraction. Right breast exhibits no inverted nipple, no mass, no nipple discharge, no skin change, no tenderness, presence, no bleeding and no swelling. Left breast exhibits no inverted nipple, no mass, no nipple discharge, no skin change, no tenderness, presence, no bleeding and no swelling. Breasts are symmetrical.        Abdominal: Soft. She exhibits no distension and no mass. There is no tenderness. There is no rebound and no guarding. No hernia. Hernia confirmed negative in the left inguinal area.     Genitourinary: Vagina normal and uterus normal. Rectal exam shows no external hemorrhoid. There is no rash, tenderness or lesion on the right labia. There is no rash, tenderness or lesion on the left labia. Uterus is not deviated. Cervix is normal. No no adexnal prolapse. Right adnexum displays no mass, no tenderness and no fullness. Left adnexum displays no mass, no tenderness and no fullness. No tenderness, bleeding, rectocele, cystocele or unspecified prolapse of vaginal walls in the vagina. No vaginal discharge found. Cervix exhibits no motion tenderness, no discharge and no friability.           Musculoskeletal: Normal range of motion and moves all extremeties. She exhibits no edema.      Lymphadenopathy:        Right: No inguinal adenopathy present.        Left: No inguinal adenopathy present.    Neurological: She is alert and oriented to person, place, and time.    Skin: No rash noted. No erythema. No pallor.    Psychiatric:  She has a normal mood and affect. Her behavior is normal. Judgment and thought content normal.         ASSESSMENT:   well woman  Pelvic pain  PLAN:   mammogram  pap smear  Counseled her on use of eye checks and magnesium to help with her cramping and her periods  Pelvic ultrasound  Will consider restarting OCPs if pain continues  return annually or prn

## 2019-08-15 ENCOUNTER — TELEPHONE (OUTPATIENT)
Dept: FAMILY MEDICINE | Facility: CLINIC | Age: 44
End: 2019-08-15

## 2019-08-15 ENCOUNTER — TELEPHONE (OUTPATIENT)
Dept: OBSTETRICS AND GYNECOLOGY | Facility: CLINIC | Age: 44
End: 2019-08-15

## 2019-08-15 ENCOUNTER — LAB VISIT (OUTPATIENT)
Dept: LAB | Facility: OTHER | Age: 44
End: 2019-08-15
Payer: COMMERCIAL

## 2019-08-15 DIAGNOSIS — N92.0 MENORRHAGIA WITH REGULAR CYCLE: ICD-10-CM

## 2019-08-15 DIAGNOSIS — N85.8 UTERINE MASS: Primary | ICD-10-CM

## 2019-08-15 DIAGNOSIS — Z00.00 ROUTINE MEDICAL EXAM: ICD-10-CM

## 2019-08-15 DIAGNOSIS — Z00.00 ROUTINE MEDICAL EXAM: Primary | ICD-10-CM

## 2019-08-15 LAB
25(OH)D3+25(OH)D2 SERPL-MCNC: 37 NG/ML (ref 30–96)
ALBUMIN SERPL BCP-MCNC: 4.3 G/DL (ref 3.5–5.2)
ALP SERPL-CCNC: 45 U/L (ref 55–135)
ALT SERPL W/O P-5'-P-CCNC: 14 U/L (ref 10–44)
ANION GAP SERPL CALC-SCNC: 9 MMOL/L (ref 8–16)
APTT BLDCRRT: 32 SEC (ref 21–32)
AST SERPL-CCNC: 18 U/L (ref 10–40)
BASOPHILS # BLD AUTO: 0.04 K/UL (ref 0–0.2)
BASOPHILS NFR BLD: 0.8 % (ref 0–1.9)
BILIRUB SERPL-MCNC: 0.8 MG/DL (ref 0.1–1)
BUN SERPL-MCNC: 11 MG/DL (ref 6–20)
CALCIUM SERPL-MCNC: 9 MG/DL (ref 8.7–10.5)
CHLORIDE SERPL-SCNC: 105 MMOL/L (ref 95–110)
CHOLEST SERPL-MCNC: 163 MG/DL (ref 120–199)
CHOLEST/HDLC SERPL: 2.4 {RATIO} (ref 2–5)
CO2 SERPL-SCNC: 25 MMOL/L (ref 23–29)
CREAT SERPL-MCNC: 0.8 MG/DL (ref 0.5–1.4)
DIFFERENTIAL METHOD: ABNORMAL
EOSINOPHIL # BLD AUTO: 0.1 K/UL (ref 0–0.5)
EOSINOPHIL NFR BLD: 1.6 % (ref 0–8)
ERYTHROCYTE [DISTWIDTH] IN BLOOD BY AUTOMATED COUNT: 13.4 % (ref 11.5–14.5)
EST. GFR  (AFRICAN AMERICAN): >60 ML/MIN/1.73 M^2
EST. GFR  (NON AFRICAN AMERICAN): >60 ML/MIN/1.73 M^2
FERRITIN SERPL-MCNC: 5 NG/ML (ref 20–300)
GLUCOSE SERPL-MCNC: 80 MG/DL (ref 70–110)
HCG INTACT+B SERPL-ACNC: <1.2 MIU/ML
HCT VFR BLD AUTO: 34.8 % (ref 37–48.5)
HDLC SERPL-MCNC: 67 MG/DL (ref 40–75)
HDLC SERPL: 41.1 % (ref 20–50)
HGB BLD-MCNC: 11 G/DL (ref 12–16)
IMM GRANULOCYTES # BLD AUTO: 0.01 K/UL (ref 0–0.04)
IMM GRANULOCYTES NFR BLD AUTO: 0.2 % (ref 0–0.5)
INR PPP: 1 (ref 0.8–1.2)
IRON SERPL-MCNC: 46 UG/DL (ref 30–160)
LDLC SERPL CALC-MCNC: 87.4 MG/DL (ref 63–159)
LYMPHOCYTES # BLD AUTO: 1.5 K/UL (ref 1–4.8)
LYMPHOCYTES NFR BLD: 29.1 % (ref 18–48)
MCH RBC QN AUTO: 26.4 PG (ref 27–31)
MCHC RBC AUTO-ENTMCNC: 31.6 G/DL (ref 32–36)
MCV RBC AUTO: 84 FL (ref 82–98)
MONOCYTES # BLD AUTO: 0.2 K/UL (ref 0.3–1)
MONOCYTES NFR BLD: 4 % (ref 4–15)
NEUTROPHILS # BLD AUTO: 3.2 K/UL (ref 1.8–7.7)
NEUTROPHILS NFR BLD: 64.3 % (ref 38–73)
NONHDLC SERPL-MCNC: 96 MG/DL
NRBC BLD-RTO: 0 /100 WBC
PLATELET # BLD AUTO: 181 K/UL (ref 150–350)
PMV BLD AUTO: 9.1 FL (ref 9.2–12.9)
POTASSIUM SERPL-SCNC: 3.6 MMOL/L (ref 3.5–5.1)
PROT SERPL-MCNC: 6.9 G/DL (ref 6–8.4)
PROTHROMBIN TIME: 11.4 SEC (ref 9–12.5)
RBC # BLD AUTO: 4.16 M/UL (ref 4–5.4)
SATURATED IRON: 9 % (ref 20–50)
SODIUM SERPL-SCNC: 139 MMOL/L (ref 136–145)
T4 FREE SERPL-MCNC: 0.9 NG/DL (ref 0.71–1.51)
TOTAL IRON BINDING CAPACITY: 493 UG/DL (ref 250–450)
TRANSFERRIN SERPL-MCNC: 333 MG/DL (ref 200–375)
TRIGL SERPL-MCNC: 43 MG/DL (ref 30–150)
TSH SERPL DL<=0.005 MIU/L-ACNC: 1.85 UIU/ML (ref 0.4–4)
VIT B12 SERPL-MCNC: 661 PG/ML (ref 210–950)
WBC # BLD AUTO: 5.01 K/UL (ref 3.9–12.7)

## 2019-08-15 PROCEDURE — 83540 ASSAY OF IRON: CPT

## 2019-08-15 PROCEDURE — 84702 CHORIONIC GONADOTROPIN TEST: CPT

## 2019-08-15 PROCEDURE — 85610 PROTHROMBIN TIME: CPT

## 2019-08-15 PROCEDURE — 85730 THROMBOPLASTIN TIME PARTIAL: CPT

## 2019-08-15 PROCEDURE — 82607 VITAMIN B-12: CPT

## 2019-08-15 PROCEDURE — 84443 ASSAY THYROID STIM HORMONE: CPT

## 2019-08-15 PROCEDURE — 80053 COMPREHEN METABOLIC PANEL: CPT

## 2019-08-15 PROCEDURE — 36415 COLL VENOUS BLD VENIPUNCTURE: CPT

## 2019-08-15 PROCEDURE — 85025 COMPLETE CBC W/AUTO DIFF WBC: CPT

## 2019-08-15 PROCEDURE — 84439 ASSAY OF FREE THYROXINE: CPT

## 2019-08-15 PROCEDURE — 82306 VITAMIN D 25 HYDROXY: CPT

## 2019-08-15 PROCEDURE — 80061 LIPID PANEL: CPT

## 2019-08-15 PROCEDURE — 82728 ASSAY OF FERRITIN: CPT

## 2019-08-15 NOTE — TELEPHONE ENCOUNTER
Patient called advising that she will need upcoming removal of endometrial mass.  She was told she might need medical clearance.  I have ordered blood work and otherwise she is currently without any cardiopulmonary or infectious symptoms and she is medically cleared

## 2019-08-15 NOTE — TELEPHONE ENCOUNTER
----- Message from Josiane Brown sent at 8/15/2019  1:45 PM CDT -----  Contact: CHRISTINA COSTA [[id number]]   Name of Who is Calling: CHRISTINA COSTA [[id number]]       What is the request in detail: Patient is returning a call from staff she has questions about her upcoming procedure  .....Please contact to further discuss and advise.     Can the clinic reply by [facility]: yes     What Number to Call Back if not in [facility]: [phone]

## 2019-08-19 ENCOUNTER — PATIENT MESSAGE (OUTPATIENT)
Dept: OBSTETRICS AND GYNECOLOGY | Facility: CLINIC | Age: 44
End: 2019-08-19

## 2019-08-20 LAB
HPV HR 12 DNA CVX QL NAA+PROBE: NEGATIVE
HPV16 AG SPEC QL: NEGATIVE
HPV18 DNA SPEC QL NAA+PROBE: NEGATIVE

## 2019-08-22 ENCOUNTER — OFFICE VISIT (OUTPATIENT)
Dept: OBSTETRICS AND GYNECOLOGY | Facility: CLINIC | Age: 44
End: 2019-08-22
Attending: OBSTETRICS & GYNECOLOGY
Payer: COMMERCIAL

## 2019-08-22 ENCOUNTER — HOSPITAL ENCOUNTER (OUTPATIENT)
Dept: PREADMISSION TESTING | Facility: OTHER | Age: 44
Discharge: HOME | End: 2019-08-22
Attending: OBSTETRICS & GYNECOLOGY
Payer: COMMERCIAL

## 2019-08-22 ENCOUNTER — ANESTHESIA EVENT (OUTPATIENT)
Dept: SURGERY | Facility: OTHER | Age: 44
End: 2019-08-22
Payer: COMMERCIAL

## 2019-08-22 VITALS
OXYGEN SATURATION: 99 % | WEIGHT: 125 LBS | HEART RATE: 74 BPM | SYSTOLIC BLOOD PRESSURE: 91 MMHG | TEMPERATURE: 99 F | DIASTOLIC BLOOD PRESSURE: 52 MMHG | HEIGHT: 65 IN | BODY MASS INDEX: 20.83 KG/M2

## 2019-08-22 VITALS
HEIGHT: 65 IN | BODY MASS INDEX: 21.23 KG/M2 | WEIGHT: 127.44 LBS | SYSTOLIC BLOOD PRESSURE: 108 MMHG | DIASTOLIC BLOOD PRESSURE: 62 MMHG

## 2019-08-22 DIAGNOSIS — G89.29 CHRONIC PELVIC PAIN IN FEMALE: Primary | ICD-10-CM

## 2019-08-22 DIAGNOSIS — R93.89 ABNORMAL ULTRASOUND: ICD-10-CM

## 2019-08-22 DIAGNOSIS — R10.2 CHRONIC PELVIC PAIN IN FEMALE: Primary | ICD-10-CM

## 2019-08-22 DIAGNOSIS — N92.0 MENORRHAGIA WITH REGULAR CYCLE: ICD-10-CM

## 2019-08-22 PROCEDURE — 99214 PR OFFICE/OUTPT VISIT, EST, LEVL IV, 30-39 MIN: ICD-10-PCS | Mod: S$GLB,,, | Performed by: OBSTETRICS & GYNECOLOGY

## 2019-08-22 PROCEDURE — 3008F PR BODY MASS INDEX (BMI) DOCUMENTED: ICD-10-PCS | Mod: CPTII,S$GLB,, | Performed by: OBSTETRICS & GYNECOLOGY

## 2019-08-22 PROCEDURE — 99214 OFFICE O/P EST MOD 30 MIN: CPT | Mod: S$GLB,,, | Performed by: OBSTETRICS & GYNECOLOGY

## 2019-08-22 PROCEDURE — 3008F BODY MASS INDEX DOCD: CPT | Mod: CPTII,S$GLB,, | Performed by: OBSTETRICS & GYNECOLOGY

## 2019-08-22 RX ORDER — FAMOTIDINE 20 MG/1
20 TABLET, FILM COATED ORAL
Status: CANCELLED | OUTPATIENT
Start: 2019-08-22 | End: 2019-08-22

## 2019-08-22 RX ORDER — SODIUM CHLORIDE, SODIUM LACTATE, POTASSIUM CHLORIDE, CALCIUM CHLORIDE 600; 310; 30; 20 MG/100ML; MG/100ML; MG/100ML; MG/100ML
INJECTION, SOLUTION INTRAVENOUS CONTINUOUS
Status: CANCELLED | OUTPATIENT
Start: 2019-08-22

## 2019-08-22 RX ORDER — ACETAMINOPHEN 500 MG
1000 TABLET ORAL
Status: CANCELLED | OUTPATIENT
Start: 2019-08-22 | End: 2019-08-22

## 2019-08-22 RX ORDER — PREGABALIN 75 MG/1
75 CAPSULE ORAL
Status: CANCELLED | OUTPATIENT
Start: 2019-08-22 | End: 2019-08-22

## 2019-08-22 NOTE — H&P (VIEW-ONLY)
"She has heavy menses.           History & Physical            OBGYN    [facility] Pre-op Exam      HPI : Zara Leal is a 44 y.o. female  for evaluation and discussion of treatment options for Pre-op Exam   She has pain with her cycles and has had heavy bleeding. .   She has a history of surgery for a septate uterus  Past Medical History:   Diagnosis Date    Endometriosis of pelvis 2005     Past Surgical History:   Procedure Laterality Date    uterus septate  2005     Family History   Problem Relation Age of Onset    Breast cancer Paternal Grandmother 80    Melanoma Paternal Grandmother     Melanoma Father     Melanoma Sister     Melanoma Paternal Grandfather     Ovarian cancer Neg Hx     Colon cancer Neg Hx      Social History     Tobacco Use    Smoking status: Never Smoker    Smokeless tobacco: Never Used   Substance Use Topics    Alcohol use: Not Currently    Drug use: No     OB History    Para Term  AB Living   3 2 2   1 2   SAB TAB Ectopic Multiple Live Births   1              # Outcome Date GA Lbr Alejandro/2nd Weight Sex Delivery Anes PTL Lv   3 SAB            2 Term            1 Term                /62   Ht 5' 5" (1.651 m)   Wt 57.8 kg (127 lb 6.8 oz)   LMP 2019   BMI 21.20 kg/m²     ROS:    GENERAL: Denies weight gain or weight loss. Feeling well overall.   SKIN: Denies rash or lesions.   HEAD: Denies head injury or headache.   NODES: Denies enlarged lymph nodes.   CHEST: Denies chest pain or shortness of breath.   CARDIOVASCULAR: Denies palpitations or left sided chest pain.   ABDOMEN: No abdominal pain, constipation, diarrhea, nausea, vomiting or rectal bleeding.   URINARY: No frequency, dysuria, hematuria, or burning on urination.  REPRODUCTIVE: See HPI.   BREASTS: The patient performs breast self-examination and denies pain, lumps, or nipple discharge.   HEMATOLOGIC: No easy bruisability or excessive bleeding with the exception of menstrual " cycles.  MUSCULOSKELETAL: Denies joint pain or swelling.   NEUROLOGIC: Denies syncope or weakness.   PSYCHIATRIC: Denies depression, anxiety or mood swings.    PHYSICAL EXAM:    APPEARANCE: Well nourished, well developed, in no acute distress.  AFFECT: WNL, alert and oriented x 3  SKIN: No acne or hirsutism  NECK: Neck symmetric without masses or thyromegaly  NODES: No inguinal, cervical, axillary, or femoral lymph node enlargement  CHEST: Good respiratory effect  ABDOMEN: Soft.  No tenderness or masses.  No hepatosplenomegaly.  No hernias.  PELVIC: deferred   EXTREMITIES: No edema.    EXAMINATION:  US PELVIS COMP WITH TRANSVAG NON-OB (XPD)    CLINICAL HISTORY:  Pelvic and perineal pain    TECHNIQUE:  Transabdominal pelvic sonography was performed. To better visualize the ovaries and uterus transvaginal scanning was performed.    COMPARISON:  Pelvic ultrasound 11/30/2011    FINDINGS:  The uterus measures 9.0 x 4.3 x 5.2cm and demonstrates a 1.7 x 0.6 x 1.1 cm hyperechoic solid structure associated with the fundal endometrial cavity.  Elsewhere, the endometrial thickness is 3mm.  There is no endometrial fluid. The uterus is otherwise unremarkable.    The right ovary was not definitively identified..    The left ovary measures 3.9 x 2.2 x 3.5 cm and demonstrates a 2.2 cm simple cyst.  There is intact vascular flow to the left ovary.    There is no free fluid in the visualized pelvis.      Impression       1.  1.7 cm solid structure associated with the fundal endometrial cavity.  Differential includes a submucosal fibroid, endometrial polyp, endometrial hyperplasia, or endometrial carcinoma.  Further evaluation is recommended.    2.  Right ovary not visualized.  This is most likely secondary to bowel gas artifact.    3.  2.2 cm simple left ovarian cyst/follicle.         ASSESSMENT & PLAN:    Pelvic pain  Menorrhagia  Abnormal ultrasound finding    I have discussed the risks, benefits, indications, and alternatives of  the procedure in detail.  The patient verbalizes her understanding.  All questions answered.  Consents signed.  The patient agrees to proceed to proceed as planned.

## 2019-08-22 NOTE — ANESTHESIA PREPROCEDURE EVALUATION
08/22/2019  Zara Leal is a 44 y.o., female.    Anesthesia Evaluation    I have reviewed the Patient Summary Reports.    I have reviewed the Nursing Notes.   I have reviewed the Medications.     Review of Systems  Anesthesia Hx:  No problems with previous Anesthesia  Denies Family Hx of Anesthesia complications.   Denies Personal Hx of Anesthesia complications.   Social:  Non-Smoker    Hematology/Oncology:     Oncology Normal    -- Anemia:   Cardiovascular:  Cardiovascular Normal     Pulmonary:  Pulmonary Normal    Renal/:  Renal/ Normal     Hepatic/GI:  Hepatic/GI Normal    Musculoskeletal:  Musculoskeletal Normal    Neurological:  Neurology Normal    Endocrine:  Endocrine Normal        Physical Exam  General:  Well nourished    Airway/Jaw/Neck:  Airway Findings: Mouth Opening: Normal Tongue: Normal  General Airway Assessment: Adult  Mallampati: I  TM Distance: Normal, at least 6 cm         Dental:  Dental Findings: In tact, Lower retainer             Anesthesia Plan  Type of Anesthesia, risks & benefits discussed:  Anesthesia Type:  general  Patient's Preference:   Intra-op Monitoring Plan: standard ASA monitors  Intra-op Monitoring Plan Comments:   Post Op Pain Control Plan: multimodal analgesia and per primary service following discharge from PACU  Post Op Pain Control Plan Comments:   Induction:   IV  Beta Blocker:         Informed Consent: Patient understands risks and agrees with Anesthesia plan.  Questions answered. Anesthesia consent signed with patient.  ASA Score: 1     Day of Surgery Review of History & Physical:    H&P update referred to the surgeon.         Ready For Surgery From Anesthesia Perspective.

## 2019-08-22 NOTE — DISCHARGE INSTRUCTIONS
PRE-ADMIT TESTING -  [phone]    [address]  [place] [facility]          Your surgery has been scheduled at [facility]. We are pleased to have the opportunity to serve you. For Further Information please call [phone].    On the day of surgery please report to the Information Desk on the 1st floor.    · CONTACT YOUR PHYSICIAN'S OFFICE THE DAY PRIOR TO YOUR SURGERY TO OBTAIN YOUR ARRIVAL TIME.     · The evening before surgery do not eat anything after 9 p.m. ( this includes hard candy, chewing gum and mints).  You may only have GATORADE, POWERADE AND WATER  from 9 p.m. until you leave your home.   DO NOT DRINK ANY LIQUIDS ON THE WAY TO THE HOSPITAL.      SPECIAL MEDICATION INSTRUCTIONS: TAKE medications checked off by the Anesthesiologist on your Medication List.    Angiogram Patients: Take medications as instructed by your physician, including aspirin.     Surgery Patients:    If you take ASPIRIN - Your PHYSICIAN/SURGEON will need to inform you IF/OR when you need to stop taking aspirin prior to your surgery.     Do Not take any medications containing IBUPROFEN.  Do Not Wear any make-up or dark nail polish   (especially eye make-up) to surgery. If you come to surgery with makeup on you will be required to remove the makeup or nail polish.    Do not shave your surgical area at least 5 days prior to your surgery. The surgical prep will be performed at the hospital according to Infection Control regulations.    Leave all valuables at home.   Do Not wear any jewelry or watches, including any metal in body piercings. Jewelry must be removed prior to coming to the hospital.  There is a possibility that rings that are unable to be removed may be cut off if they are on the surgical extremity.    Contact Lens must be removed before surgery. Either do not wear the contact lens or bring a case and solution for storage.  Please bring a container for eyeglasses or dentures as required.  Bring  any paperwork your physician has provided, such as consent forms,  history and physicals, doctor's orders, etc.   Bring comfortable clothes that are loose fitting to wear upon discharge. Take into consideration the type of surgery being performed.  Maintain your diet as advised per your physician the day prior to surgery.      Adequate rest the night before surgery is advised.   Park in the Parking lot behind the hospital or in the [facility] across the street from the parking lot. Parking is complimentary.  If you will be discharged the same day as your procedure, please arrange for a responsible adult to drive you home or to accompany you if traveling by taxi.   YOU WILL NOT BE PERMITTED TO DRIVE OR TO LEAVE THE HOSPITAL ALONE AFTER SURGERY.   It is strongly recommended that you arrange for someone to remain with you for the first 24 hrs following your surgery.    The Surgeon will speak to your family/visitor after your surgery regarding the outcome of your surgery and post op care.  The Surgeon may speak to you after your surgery, but there is a possibility you may not remember the details.  Please check with your family members regarding the conversation with the Surgeon.    We strongly recommend whoever is bringing you home be present for discharge instructions.  This will ensure a thorough understanding for your post op home care.      Thank you for your cooperation.  The Staff of [facility].                Bathing Instructions with Hibiclens     Shower the evening before and morning of your procedure with Hibiclens:   Wash your face with water and your regular face wash/soap   Apply Hibiclens directly on your skin or on a wet washcloth and wash gently. When showering: Move away from the shower stream when applying Hibiclens to avoid rinsing off too soon.   Rinse thoroughly with warm water   Do not dilute Hibiclens         Dry off as usual, do not use any deodorant,  powder, body lotions, perfume, after shave or cologne.

## 2019-08-28 ENCOUNTER — ANESTHESIA (OUTPATIENT)
Dept: SURGERY | Facility: OTHER | Age: 44
End: 2019-08-28
Payer: COMMERCIAL

## 2019-08-28 ENCOUNTER — HOSPITAL ENCOUNTER (OUTPATIENT)
Facility: OTHER | Age: 44
Discharge: HOME | End: 2019-08-28
Attending: OBSTETRICS & GYNECOLOGY | Admitting: OBSTETRICS & GYNECOLOGY
Payer: COMMERCIAL

## 2019-08-28 VITALS
TEMPERATURE: 98 F | OXYGEN SATURATION: 100 % | DIASTOLIC BLOOD PRESSURE: 62 MMHG | SYSTOLIC BLOOD PRESSURE: 112 MMHG | WEIGHT: 125 LBS | BODY MASS INDEX: 20.83 KG/M2 | RESPIRATION RATE: 16 BRPM | HEIGHT: 65 IN | HEART RATE: 76 BPM

## 2019-08-28 DIAGNOSIS — G89.29 CHRONIC PELVIC PAIN IN FEMALE: ICD-10-CM

## 2019-08-28 DIAGNOSIS — R93.89 ABNORMAL ULTRASOUND: ICD-10-CM

## 2019-08-28 DIAGNOSIS — Z98.890 STATUS POST HYSTEROSCOPY: Primary | ICD-10-CM

## 2019-08-28 DIAGNOSIS — R10.2 CHRONIC PELVIC PAIN IN FEMALE: ICD-10-CM

## 2019-08-28 DIAGNOSIS — N92.0 MENORRHAGIA WITH REGULAR CYCLE: ICD-10-CM

## 2019-08-28 LAB
B-HCG UR QL: NEGATIVE
CTP QC/QA: YES

## 2019-08-28 PROCEDURE — 71000015 HC POSTOP RECOV 1ST HR: Performed by: OBSTETRICS & GYNECOLOGY

## 2019-08-28 PROCEDURE — 63600175 PHARM REV CODE 636 W HCPCS: Performed by: ANESTHESIOLOGY

## 2019-08-28 PROCEDURE — 36000707: Performed by: OBSTETRICS & GYNECOLOGY

## 2019-08-28 PROCEDURE — 37000008 HC ANESTHESIA 1ST 15 MINUTES: Performed by: OBSTETRICS & GYNECOLOGY

## 2019-08-28 PROCEDURE — 58558 HYSTEROSCOPY BIOPSY: CPT | Mod: ,,, | Performed by: OBSTETRICS & GYNECOLOGY

## 2019-08-28 PROCEDURE — 27201423 OPTIME MED/SURG SUP & DEVICES STERILE SUPPLY: Performed by: OBSTETRICS & GYNECOLOGY

## 2019-08-28 PROCEDURE — 37000009 HC ANESTHESIA EA ADD 15 MINS: Performed by: OBSTETRICS & GYNECOLOGY

## 2019-08-28 PROCEDURE — 88305 TISSUE EXAM BY PATHOLOGIST: CPT | Mod: 26,,, | Performed by: PATHOLOGY

## 2019-08-28 PROCEDURE — C1782 MORCELLATOR: HCPCS | Performed by: OBSTETRICS & GYNECOLOGY

## 2019-08-28 PROCEDURE — 71000033 HC RECOVERY, INTIAL HOUR: Performed by: OBSTETRICS & GYNECOLOGY

## 2019-08-28 PROCEDURE — 88305 TISSUE SPECIMEN TO PATHOLOGY - SURGERY: ICD-10-PCS | Mod: 26,,, | Performed by: PATHOLOGY

## 2019-08-28 PROCEDURE — 88305 TISSUE EXAM BY PATHOLOGIST: CPT | Performed by: PATHOLOGY

## 2019-08-28 PROCEDURE — 81025 URINE PREGNANCY TEST: CPT | Performed by: ANESTHESIOLOGY

## 2019-08-28 PROCEDURE — 36000706: Performed by: OBSTETRICS & GYNECOLOGY

## 2019-08-28 PROCEDURE — 63600175 PHARM REV CODE 636 W HCPCS: Performed by: NURSE ANESTHETIST, CERTIFIED REGISTERED

## 2019-08-28 PROCEDURE — 58558 PR HYSTEROSCOPY,W/ENDO BX: ICD-10-PCS | Mod: ,,, | Performed by: OBSTETRICS & GYNECOLOGY

## 2019-08-28 PROCEDURE — 25000003 PHARM REV CODE 250: Performed by: NURSE ANESTHETIST, CERTIFIED REGISTERED

## 2019-08-28 PROCEDURE — 25000003 PHARM REV CODE 250: Performed by: ANESTHESIOLOGY

## 2019-08-28 RX ORDER — IBUPROFEN 600 MG/1
600 TABLET ORAL 3 TIMES DAILY
Qty: 30 TABLET | Refills: 0 | Status: ON HOLD | OUTPATIENT
Start: 2019-08-28 | End: 2023-06-02 | Stop reason: HOSPADM

## 2019-08-28 RX ORDER — DIPHENHYDRAMINE HYDROCHLORIDE 50 MG/ML
25 INJECTION INTRAMUSCULAR; INTRAVENOUS EVERY 6 HOURS PRN
Status: DISCONTINUED | OUTPATIENT
Start: 2019-08-28 | End: 2019-08-28 | Stop reason: HOSPADM

## 2019-08-28 RX ORDER — MEPERIDINE HYDROCHLORIDE 25 MG/ML
12.5 INJECTION INTRAMUSCULAR; INTRAVENOUS; SUBCUTANEOUS ONCE AS NEEDED
Status: DISCONTINUED | OUTPATIENT
Start: 2019-08-28 | End: 2019-08-28 | Stop reason: HOSPADM

## 2019-08-28 RX ORDER — HYDROMORPHONE HYDROCHLORIDE 2 MG/ML
0.4 INJECTION, SOLUTION INTRAMUSCULAR; INTRAVENOUS; SUBCUTANEOUS EVERY 5 MIN PRN
Status: DISCONTINUED | OUTPATIENT
Start: 2019-08-28 | End: 2019-08-28 | Stop reason: HOSPADM

## 2019-08-28 RX ORDER — HYDROCODONE BITARTRATE AND ACETAMINOPHEN 5; 325 MG/1; MG/1
1 TABLET ORAL EVERY 6 HOURS PRN
Qty: 7 TABLET | Refills: 0 | Status: ON HOLD | OUTPATIENT
Start: 2019-08-28 | End: 2023-06-02 | Stop reason: HOSPADM

## 2019-08-28 RX ORDER — MIDAZOLAM HYDROCHLORIDE 1 MG/ML
INJECTION, SOLUTION INTRAMUSCULAR; INTRAVENOUS
Status: DISCONTINUED | OUTPATIENT
Start: 2019-08-28 | End: 2019-08-28

## 2019-08-28 RX ORDER — OXYCODONE HYDROCHLORIDE 5 MG/1
5 TABLET ORAL
Status: DISCONTINUED | OUTPATIENT
Start: 2019-08-28 | End: 2019-08-28 | Stop reason: HOSPADM

## 2019-08-28 RX ORDER — PREGABALIN 75 MG/1
75 CAPSULE ORAL
Status: COMPLETED | OUTPATIENT
Start: 2019-08-28 | End: 2019-08-28

## 2019-08-28 RX ORDER — PROPOFOL 10 MG/ML
VIAL (ML) INTRAVENOUS
Status: DISCONTINUED | OUTPATIENT
Start: 2019-08-28 | End: 2019-08-28

## 2019-08-28 RX ORDER — FENTANYL CITRATE 50 UG/ML
INJECTION, SOLUTION INTRAMUSCULAR; INTRAVENOUS
Status: DISCONTINUED | OUTPATIENT
Start: 2019-08-28 | End: 2019-08-28

## 2019-08-28 RX ORDER — LIDOCAINE HCL/PF 100 MG/5ML
SYRINGE (ML) INTRAVENOUS
Status: DISCONTINUED | OUTPATIENT
Start: 2019-08-28 | End: 2019-08-28

## 2019-08-28 RX ORDER — DIPHENHYDRAMINE HCL 25 MG
25 CAPSULE ORAL EVERY 4 HOURS PRN
Status: DISCONTINUED | OUTPATIENT
Start: 2019-08-28 | End: 2019-08-28 | Stop reason: HOSPADM

## 2019-08-28 RX ORDER — SODIUM CHLORIDE 0.9 % (FLUSH) 0.9 %
3 SYRINGE (ML) INJECTION
Status: DISCONTINUED | OUTPATIENT
Start: 2019-08-28 | End: 2019-08-28 | Stop reason: HOSPADM

## 2019-08-28 RX ORDER — DEXAMETHASONE SODIUM PHOSPHATE 4 MG/ML
INJECTION, SOLUTION INTRA-ARTICULAR; INTRALESIONAL; INTRAMUSCULAR; INTRAVENOUS; SOFT TISSUE
Status: DISCONTINUED | OUTPATIENT
Start: 2019-08-28 | End: 2019-08-28

## 2019-08-28 RX ORDER — IBUPROFEN 600 MG/1
600 TABLET ORAL EVERY 6 HOURS PRN
Status: DISCONTINUED | OUTPATIENT
Start: 2019-08-28 | End: 2019-08-28 | Stop reason: HOSPADM

## 2019-08-28 RX ORDER — GLYCOPYRROLATE 0.2 MG/ML
INJECTION INTRAMUSCULAR; INTRAVENOUS
Status: DISCONTINUED | OUTPATIENT
Start: 2019-08-28 | End: 2019-08-28

## 2019-08-28 RX ORDER — ONDANSETRON 2 MG/ML
4 INJECTION INTRAMUSCULAR; INTRAVENOUS DAILY PRN
Status: DISCONTINUED | OUTPATIENT
Start: 2019-08-28 | End: 2019-08-28 | Stop reason: HOSPADM

## 2019-08-28 RX ORDER — ACETAMINOPHEN 500 MG
1000 TABLET ORAL
Status: COMPLETED | OUTPATIENT
Start: 2019-08-28 | End: 2019-08-28

## 2019-08-28 RX ORDER — ONDANSETRON 2 MG/ML
INJECTION INTRAMUSCULAR; INTRAVENOUS
Status: DISCONTINUED | OUTPATIENT
Start: 2019-08-28 | End: 2019-08-28

## 2019-08-28 RX ORDER — HYDROCODONE BITARTRATE AND ACETAMINOPHEN 5; 325 MG/1; MG/1
1 TABLET ORAL EVERY 4 HOURS PRN
Status: DISCONTINUED | OUTPATIENT
Start: 2019-08-28 | End: 2019-08-28 | Stop reason: HOSPADM

## 2019-08-28 RX ORDER — DIPHENHYDRAMINE HYDROCHLORIDE 50 MG/ML
25 INJECTION INTRAMUSCULAR; INTRAVENOUS EVERY 4 HOURS PRN
Status: DISCONTINUED | OUTPATIENT
Start: 2019-08-28 | End: 2019-08-28 | Stop reason: HOSPADM

## 2019-08-28 RX ORDER — ONDANSETRON 8 MG/1
8 TABLET, ORALLY DISINTEGRATING ORAL EVERY 8 HOURS PRN
Status: DISCONTINUED | OUTPATIENT
Start: 2019-08-28 | End: 2019-08-28 | Stop reason: HOSPADM

## 2019-08-28 RX ORDER — FAMOTIDINE 20 MG/1
20 TABLET, FILM COATED ORAL
Status: COMPLETED | OUTPATIENT
Start: 2019-08-28 | End: 2019-08-28

## 2019-08-28 RX ORDER — SODIUM CHLORIDE, SODIUM LACTATE, POTASSIUM CHLORIDE, CALCIUM CHLORIDE 600; 310; 30; 20 MG/100ML; MG/100ML; MG/100ML; MG/100ML
INJECTION, SOLUTION INTRAVENOUS CONTINUOUS
Status: DISCONTINUED | OUTPATIENT
Start: 2019-08-28 | End: 2019-08-28 | Stop reason: HOSPADM

## 2019-08-28 RX ADMIN — DEXAMETHASONE SODIUM PHOSPHATE 8 MG: 4 INJECTION, SOLUTION INTRAMUSCULAR; INTRAVENOUS at 10:08

## 2019-08-28 RX ADMIN — LIDOCAINE HYDROCHLORIDE 50 MG: 20 INJECTION, SOLUTION INTRAVENOUS at 10:08

## 2019-08-28 RX ADMIN — SODIUM CHLORIDE, SODIUM LACTATE, POTASSIUM CHLORIDE, AND CALCIUM CHLORIDE: 600; 310; 30; 20 INJECTION, SOLUTION INTRAVENOUS at 10:08

## 2019-08-28 RX ADMIN — FENTANYL CITRATE 100 MCG: 50 INJECTION, SOLUTION INTRAMUSCULAR; INTRAVENOUS at 10:08

## 2019-08-28 RX ADMIN — PHENYLEPHRINE HYDROCHLORIDE 100 MCG: 10 INJECTION INTRAVENOUS at 10:08

## 2019-08-28 RX ADMIN — GLYCOPYRROLATE 0.2 MG: 0.2 INJECTION, SOLUTION INTRAMUSCULAR; INTRAVENOUS at 10:08

## 2019-08-28 RX ADMIN — ACETAMINOPHEN 1000 MG: 500 TABLET, FILM COATED ORAL at 08:08

## 2019-08-28 RX ADMIN — MIDAZOLAM 2 MG: 1 INJECTION INTRAMUSCULAR; INTRAVENOUS at 10:08

## 2019-08-28 RX ADMIN — SODIUM CHLORIDE, SODIUM LACTATE, POTASSIUM CHLORIDE, AND CALCIUM CHLORIDE: 600; 310; 30; 20 INJECTION, SOLUTION INTRAVENOUS at 09:08

## 2019-08-28 RX ADMIN — PROPOFOL 200 MG: 10 INJECTION, EMULSION INTRAVENOUS at 10:08

## 2019-08-28 RX ADMIN — FAMOTIDINE 20 MG: 20 TABLET, FILM COATED ORAL at 08:08

## 2019-08-28 RX ADMIN — ONDANSETRON 4 MG: 2 INJECTION INTRAMUSCULAR; INTRAVENOUS at 10:08

## 2019-08-28 RX ADMIN — PREGABALIN 75 MG: 75 CAPSULE ORAL at 08:08

## 2019-08-28 NOTE — DISCHARGE INSTRUCTIONS
Home Care Instruction D&C Hysteroscopy             ACTIVITY LEVEL:  If you received sedation and/or an anesthetic, you may feel sleepy for several hours. Rest until you feel more  awake. Gradually resume your normal activities.    DIET:  At home, continue with liquids. If there is no nausea, you may eat a soft diet and gradually resume a regular diet.    BATHING:  You may shower  as desired in one day.  You should avoid tub baths, hot tubs and swimming pools until seen by your physician for a post-op follow up.    CARE:  You can expect watery or bloody vaginal discharge for several days. Do not use tampons, please only use pads. Sexual activity is restricted as advised by your doctor.    MEDICATIONS:  You will receive instructions for any pain and/or antibiotic prescriptions. Pain medication should be taken only if needed and as directed. Antibiotics, if ordered, should be taken as directed until the entire prescription is completed.    WHEN TO CALL THE DOCTOR:   For any heavy vaginal bleeding   Fever over 101°F (38.4°C)   Any lower abdominal pain not relieved by the pain mediation    FOR EMERGENCIES:  If any unusual problems or difficulties occur, contact Dr. Chamberlain or the resident at [phone] (page ) or the Clinic office, [phone].      Anesthesia: After Your Surgery  Youve just had surgery. During surgery, you received medication called anesthesia to keep you comfortable and pain-free. After surgery, you may experience some pain or nausea. This is common. Here are some tips for feeling better and recovering after surgery.    Going home  Your doctor or nurse will show you how to take care of yourself when you go home. He or she will also answer your questions. Have an adult family member or friend drive you home. For the first 24 hours after your surgery:    · Do not drive or use heavy equipment.  · Do not make important decisions or sign legal documents.  · Avoid alcohol.  · Have someone  stay with you, if needed. He or she can watch for problems and help keep you safe.  · Take your time getting up from a seated or lying position. You may experience dizziness for 24 hours.    Be sure to keep all follow-up appointments with your doctor. And rest after your procedure for as long as your doctor tells you to.    Coping with pain  If you have pain after surgery, pain medication will help you feel better. Take it as directed, before pain becomes severe. Also, ask your doctor or pharmacist about other ways to control pain, such as with heat, ice, and relaxation. And follow any other instructions your surgeon or nurse gives you.    URINARY RETENTION  Should you experience a decrease in your urine output or are unable to urinate following surgery, this can be due to the medications given during surgery.  We recommend you going to the nearest Emergency Department.    Tips for taking pain medication  To get the best relief possible, remember these points:    · Pain medications can upset your stomach. Taking them with a little food may help.  · Most pain relievers taken by mouth need at least 20 to 30 minutes to take effect.  · Taking medication on a schedule can help you remember to take it. Try to time your medication so that you can take it before beginning an activity, such as dressing, walking, or sitting down for dinner.  · Constipation is a common side effect of pain medications. Contact your doctor before taking any medications like laxatives or stool softeners to help relieve constipation. Also ask about any dietary restrictions, because drinking lots of fluids and eating foods like fruits and vegetables that are high in fiber can also help. Remember, dont take laxatives unless your surgeon has prescribed them.  · Mixing alcohol and pain medication can cause dizziness and slow your breathing. It can even be fatal. Dont drink alcohol while taking pain medication.  · Pain medication can slow your  reflexes. Dont drive or operate machinery while taking pain medication.    If your health care provider tells you to take acetaminophen to help relieve your pain, ask him or her how much you are supposed to take each day. (Acetaminophen is the generic name for Tylenol and other brand-name pain relievers.) Acetaminophen or other pain relievers may interact with your prescription medicines or other over-the-counter (OTC) drugs. Some prescription medications contain acetaminophen along with other active ingredients. Using both prescription and OTC acetaminophen for pain can cause you to overdose. The FDA recommends that you read the labels on your OTC medications carefully. This will help you to clearly understand the list of active ingredients, dosing instructions, and any warnings. It may also help you avoid taking too much acetaminophen. If you have questions or don't understand the information, ask your pharmacist or health care provider to explain it to you before you take the OTC medication.    Managing nausea  Some people have an upset stomach after surgery. This is often due to anesthesia, pain, pain medications, or the stress of surgery. The following tips will help you manage nausea and get good nutrition as you recover. If you were on a special diet before surgery, ask your doctor if you should follow it during recovery. These tips may help:    · Dont push yourself to eat. Your body will tell you when to eat and how much.  · Start off with clear liquids and soup. They are easier to digest.  · Progress to semi-solid foods (mashed potatoes, applesauce, and gelatin) as you feel ready.  · Slowly move to solid foods. Dont eat fatty, rich, or spicy foods at first.  · Dont force yourself to have three large meals a day. Instead, eat smaller amounts more often.  · Take pain medications with a small amount of solid food, such as crackers or toast to avoid nausea.      Call your surgeon if    · You feel too  sleepy, dizzy, or groggy (medication may be too strong).  · You have side effects like nausea, vomiting, or skin changes (rash, itching, or hives).     © 7577-5349 [employer]. [address] [place] [address], [place] [address]. All rights reserved. This information is not intended as a substitute for professional medical care. Always follow your healthcare professional's instructions.    PLEASE FOLLOW ANY OTHER INSTRUCTIONS PROVIDED TO YOU BY DR. CLAIRE!

## 2019-08-28 NOTE — INTERVAL H&P NOTE
The patient has been examined and the H&P has been reviewed:    I concur with the findings and no changes have occurred since H&P was written.    Anesthesia/Surgery risks, benefits and alternative options discussed and understood by patient/family.          Active Hospital Problems    Diagnosis  POA    Chronic pelvic pain in female [R10.2, G89.29]  Yes      Resolved Hospital Problems   No resolved problems to display.

## 2019-08-28 NOTE — ANESTHESIA POSTPROCEDURE EVALUATION
Anesthesia Post Evaluation    Patient: Zara Leal    Procedure(s) Performed: Procedure(s) (LRB):  HYSTEROSCOPY, WITH DILATION AND CURETTAGE OF UTERUS (N/A)    Final Anesthesia Type: general  Patient location during evaluation: PACU  Patient participation: Yes- Able to Participate  Level of consciousness: awake and alert  Post-procedure vital signs: reviewed and stable  Pain management: adequate  Airway patency: patent  PONV status at discharge: No PONV  Anesthetic complications: no      Cardiovascular status: blood pressure returned to baseline and stable  Respiratory status: unassisted, spontaneous ventilation and room air  Hydration status: euvolemic  Follow-up not needed.          Vitals Value Taken Time   /73 8/28/2019 12:50 PM   Temp 36.4 °C (97.6 °F) 8/28/2019 12:20 PM   Pulse 74 8/28/2019 12:50 PM   Resp 16 8/28/2019 12:50 PM   SpO2 100 % 8/28/2019 12:50 PM         Event Time     Out of Recovery 12:11:00          Pain/Alexandre Score: Pain Rating Prior to Med Admin: 0 (8/28/2019  8:39 AM)  Alexandre Score: 10 (8/28/2019 12:50 PM)

## 2019-08-28 NOTE — TRANSFER OF CARE
"Anesthesia Transfer of Care Note    Patient: Zara Leal    Procedure(s) Performed: Procedure(s) (LRB):  HYSTEROSCOPY, WITH DILATION AND CURETTAGE OF UTERUS (N/A)    Patient location: PACU    Anesthesia Type: general    Transport from OR: Transported from OR on 2-3 L/min O2 by NC with adequate spontaneous ventilation    Post pain: adequate analgesia    Post assessment: no apparent anesthetic complications    Post vital signs: stable    Level of consciousness: awake and alert    Nausea/Vomiting: no nausea/vomiting    Complications: none    Transfer of care protocol was followed      Last vitals:   Visit Vitals  /60 (BP Location: Right arm, Patient Position: Lying)   Pulse 79   Temp 36.4 °C (97.5 °F) (Oral)   Resp 16   Ht 5' 5" (1.651 m)   Wt 56.7 kg (125 lb)   LMP 08/06/2019   SpO2 100%   Breastfeeding? No   BMI 20.80 kg/m²     "

## 2019-08-28 NOTE — PLAN OF CARE
Zara Titi Leal has met all discharge criteria from Phase II. Vital Signs are stable, ambulating  without difficulty. Discharge instructions given, patient verbalized understanding. Discharged from facility via wheelchair in stable condition.

## 2019-08-28 NOTE — DISCHARGE SUMMARY
[facility]  Brief Operative Note     SUMMARY     Surgery Date: 8/28/2019     Surgeon(s) and Role:     * Maryana Chamberlain MD - Primary    Assisting Surgeon: Marline De La O MD - First Assist     Pre-op Diagnosis:  Uterine mass [N85.9]  Menorrhagia with regular cycle [N92.0]    Post-op Diagnosis:  Post-Op Diagnosis Codes:     * Uterine mass [N85.9]     * Menorrhagia with regular cycle [N92.0]    Procedure(s) (LRB):  HYSTEROSCOPY, WITH DILATION AND CURETTAGE OF UTERUS (N/A)    Anesthesia: General    Findings:   - Normal appearing external genitalia  - Uterus sounded to 10 cm, dilated without difficulty to easily accommodate the hysteroscope  - Hysteroscopically, normal endometrium  - Posterior submucosal polyp removed with mini shaver  - Endometrial curettage performed   - Scrapings sent to pathology       Estimated Blood Loss: minimal          Specimens:   Specimen (12h ago, onward)    Start     Ordered    08/28/19 1059  Specimen to Pathology - Surgery  Once     Comments:  1. ENDOMETRIAL CURETTAGE     Start Status     08/28/19 1059 Collected (08/28/19 1058) Order ID: [id number]       08/28/19 1058          Discharge Note    SUMMARY     Admit Date: 8/28/2019    Discharge Date and Time:  08/28/2019 11:13 AM    Hospital Course (synopsis of major diagnoses, care, treatment, and services provided during the course of the hospital stay): Patient presented to outpatient surgery for scheduled hysteroscopy with D&C. Risks/benefits/alternatives were previously discussed with patient in the office and consents were signed.  Patient was taken back to the OR where the procedure was performed without difficulties. Patient placed in recovery in stable condition and ready for discharge.     Final Diagnosis: Post-Op Diagnosis Codes:     * Uterine mass [N85.9]     * Menorrhagia with regular cycle [N92.0]    Disposition: Home or Self Care    Follow Up/Patient Instructions:     Medications:  Reconciled Home  Medications:      Medication List      START taking these medications    HYDROcodone-acetaminophen 5-325 mg per tablet  Commonly known as:  NORCO  Take 1 tablet by mouth every 6 (six) hours as needed for Pain.        CHANGE how you take these medications    * ADVIL ORAL  Take by mouth as needed.  What changed:  Another medication with the same name was added. Make sure you understand how and when to take each.     * ibuprofen 600 MG tablet  Commonly known as:  ADVIL,MOTRIN  Take 1 tablet (600 mg total) by mouth 3 (three) times daily.  What changed:  You were already taking a medication with the same name, and this prescription was added. Make sure you understand how and when to take each.         * This list has 2 medication(s) that are the same as other medications prescribed for you. Read the directions carefully, and ask your doctor or other care provider to review them with you.            CONTINUE taking these medications    acyclovir 400 MG tablet  Commonly known as:  ZOVIRAX  1 po TID  For 3-5 days prn fever blisters          Discharge Procedure Orders   Diet general     Other restrictions (specify):   Order Comments: Notify physician if you soak through a pad an hour for 2 consecutive hours. Advise pelvic rest for 2 weeks (sex, douching, tampons, etc).     Call MD for:  extreme fatigue     Call MD for:  persistent dizziness or light-headedness     Call MD for:  hives     Call MD for:  redness, tenderness, or signs of infection (pain, swelling, redness, odor or green/yellow discharge around incision site)     Call MD for:  difficulty breathing, headache or visual disturbances     Call MD for:  severe uncontrolled pain     Call MD for:  persistent nausea and vomiting     Call MD for:  temperature >100.4     Activity as tolerated     Follow-up Information     Maryana Chamberlain MD In 2 weeks.    Specialties:  Obstetrics, Obstetrics and Gynecology  Why:  post operative follow up  Contact information:  [address] [address]  [phone]                   Marline De La O M.D.  [facility] PGY1

## 2019-08-28 NOTE — OP NOTE
Operative Report    Procedure: Dilation and Curettage, Hysteroscopy    Date of Surgery: 8/28/2019    Pre-Op Diagnosis: Uterine mass [N85.9]  Menorrhagia with regular cycle [N92.0]  Chronic pelvic pain in female [R10.2, G89.29]    Post-op Diagnosis: Uterine mass [N85.9]  Menorrhagia with regular cycle [N92.0]  Chronic pelvic pain in female [R10.2, G89.29]         Surgeon: Maryana Chamberlain MD    Assistant: Dr. Horan    Anesthesia: general    EBL: Minimal    IVF: normal saline          Total: 800    Urine Output: 30    Specimen: Endometrial curettage  Endometrial polyp    Findings: Uterus sounded to 9, cervical os dilated to 8 by the Hegar Dilators.  Hysteroscopic findings include: bilateral tubal ostia visualized, evidence of scar from uterine septum and endometrial polyp. Specimens obtained and sent to pathology.      Procedure in Detail:  The patient was taken to the Operating Room where general was obtained, the patient was placed in the supine position, with her legs in the Yellow Fin stirrups.  The patient was then prepped and draped in the normal sterile fashion.  The weighted speculum was placed in the posterior aspect of the vagina and the right angle retractor was placed in the anterior aspect of the vagina.  The cervix was visualized and a single-tooth tenaculum was placed on the anterior aspect of the cervix.  The uterus was sounded to 9 cm with the uterine sound.    The hysteroscope was then white balanced and the line cleared of air, it was then inserted into the cervical os and the uterine cavity was directly visualized, 1 polyp was noted at that time.The Vadim Clear device was used to shave the polyp.   The hysteroscope was then removed and currettage of the uterus of the uterus was performed and specimens were sent to pathology for evaluation.  The single tooth tenaculum was then removed and the cervix. No bleeding was noted  Sponge, lap and needle counts were correct x 2.    Hysteroscopic fluid  deficit was 0  The patient tolerated the procedure well and was taken to recovery in stable condition.

## 2019-08-29 ENCOUNTER — TELEPHONE (OUTPATIENT)
Dept: OBSTETRICS AND GYNECOLOGY | Facility: CLINIC | Age: 44
End: 2019-08-29

## 2019-08-29 NOTE — TELEPHONE ENCOUNTER
Pt calling because she had some follow up questions about surgery. Pt states she was told to expect bleeding but has not had any bleeding yet. Advised pt this can be normal and she can have bleeding or not up to 6 weeks after surgery. Pt states she is not having any pain and wanted to make sure that is ok. She is having some cramping. Advised pt this can be normal she may only have cramping and not pain and may only need to take the ibuprofen. Pt states this morning when she woke up she had a 100.0 fever and swelling but sat up and drink some fluids and the swelling and fever went away and felling fine. Advised pt if any changes to let us know. Pt verbalized understanding

## 2019-08-29 NOTE — TELEPHONE ENCOUNTER
----- Message from Jagjit Edwards sent at 8/29/2019  9:34 AM CDT -----  Contact: SELF  PT would like to speak with Antoinette Chamberlain. Please advice at [phone].

## 2019-09-03 ENCOUNTER — TELEPHONE (OUTPATIENT)
Dept: OBSTETRICS AND GYNECOLOGY | Facility: CLINIC | Age: 44
End: 2019-09-03

## 2019-09-03 DIAGNOSIS — D64.9 ANEMIA, UNSPECIFIED TYPE: Primary | ICD-10-CM

## 2019-09-03 NOTE — TELEPHONE ENCOUNTER
Pt called stating she started having bleeding on Saturday and it is getting heavier. Pt states she woke up this morning and felt faint. Pt denies and pain or fever. Advised pt she she can have bleeding up to 6 weeks after surgery. Bleeding precautions given. Advised pt to try some iron pills. Pt verbalized understanding

## 2019-09-04 ENCOUNTER — LAB VISIT (OUTPATIENT)
Dept: LAB | Facility: OTHER | Age: 44
End: 2019-09-04
Payer: COMMERCIAL

## 2019-09-04 DIAGNOSIS — D64.9 ANEMIA, UNSPECIFIED TYPE: ICD-10-CM

## 2019-09-04 LAB
ALBUMIN SERPL BCP-MCNC: 4.1 G/DL (ref 3.5–5.2)
ALP SERPL-CCNC: 50 U/L (ref 55–135)
ALT SERPL W/O P-5'-P-CCNC: 14 U/L (ref 10–44)
ANION GAP SERPL CALC-SCNC: 10 MMOL/L (ref 8–16)
AST SERPL-CCNC: 15 U/L (ref 10–40)
BASOPHILS # BLD AUTO: 0.02 K/UL (ref 0–0.2)
BASOPHILS NFR BLD: 0.4 % (ref 0–1.9)
BILIRUB SERPL-MCNC: 0.2 MG/DL (ref 0.1–1)
BUN SERPL-MCNC: 11 MG/DL (ref 6–20)
CALCIUM SERPL-MCNC: 9.1 MG/DL (ref 8.7–10.5)
CHLORIDE SERPL-SCNC: 106 MMOL/L (ref 95–110)
CO2 SERPL-SCNC: 26 MMOL/L (ref 23–29)
CREAT SERPL-MCNC: 0.8 MG/DL (ref 0.5–1.4)
DIFFERENTIAL METHOD: ABNORMAL
EOSINOPHIL # BLD AUTO: 0.2 K/UL (ref 0–0.5)
EOSINOPHIL NFR BLD: 3.1 % (ref 0–8)
ERYTHROCYTE [DISTWIDTH] IN BLOOD BY AUTOMATED COUNT: 13.6 % (ref 11.5–14.5)
EST. GFR  (AFRICAN AMERICAN): >60 ML/MIN/1.73 M^2
EST. GFR  (NON AFRICAN AMERICAN): >60 ML/MIN/1.73 M^2
GLUCOSE SERPL-MCNC: 82 MG/DL (ref 70–110)
HCT VFR BLD AUTO: 34.3 % (ref 37–48.5)
HGB BLD-MCNC: 11 G/DL (ref 12–16)
IMM GRANULOCYTES # BLD AUTO: 0.01 K/UL (ref 0–0.04)
IMM GRANULOCYTES NFR BLD AUTO: 0.2 % (ref 0–0.5)
IRON SERPL-MCNC: 306 UG/DL (ref 30–160)
LYMPHOCYTES # BLD AUTO: 1.3 K/UL (ref 1–4.8)
LYMPHOCYTES NFR BLD: 26.7 % (ref 18–48)
MCH RBC QN AUTO: 26.4 PG (ref 27–31)
MCHC RBC AUTO-ENTMCNC: 32.1 G/DL (ref 32–36)
MCV RBC AUTO: 83 FL (ref 82–98)
MONOCYTES # BLD AUTO: 0.2 K/UL (ref 0.3–1)
MONOCYTES NFR BLD: 4.8 % (ref 4–15)
NEUTROPHILS # BLD AUTO: 3.1 K/UL (ref 1.8–7.7)
NEUTROPHILS NFR BLD: 64.8 % (ref 38–73)
NRBC BLD-RTO: 0 /100 WBC
PLATELET # BLD AUTO: 200 K/UL (ref 150–350)
PMV BLD AUTO: 9.4 FL (ref 9.2–12.9)
POTASSIUM SERPL-SCNC: 3.5 MMOL/L (ref 3.5–5.1)
PROT SERPL-MCNC: 6.8 G/DL (ref 6–8.4)
RBC # BLD AUTO: 4.16 M/UL (ref 4–5.4)
SATURATED IRON: 70 % (ref 20–50)
SODIUM SERPL-SCNC: 142 MMOL/L (ref 136–145)
TOTAL IRON BINDING CAPACITY: 440 UG/DL (ref 250–450)
TRANSFERRIN SERPL-MCNC: 297 MG/DL (ref 200–375)
WBC # BLD AUTO: 4.83 K/UL (ref 3.9–12.7)

## 2019-09-04 PROCEDURE — 80053 COMPREHEN METABOLIC PANEL: CPT

## 2019-09-04 PROCEDURE — 82728 ASSAY OF FERRITIN: CPT

## 2019-09-04 PROCEDURE — 36415 COLL VENOUS BLD VENIPUNCTURE: CPT

## 2019-09-04 PROCEDURE — 83540 ASSAY OF IRON: CPT

## 2019-09-04 PROCEDURE — 85025 COMPLETE CBC W/AUTO DIFF WBC: CPT

## 2019-09-05 LAB — FERRITIN SERPL-MCNC: 11 NG/ML (ref 20–300)

## 2020-02-19 ENCOUNTER — TELEPHONE (OUTPATIENT)
Dept: ELECTROPHYSIOLOGY | Facility: CLINIC | Age: 45
End: 2020-02-19

## 2020-02-19 DIAGNOSIS — R00.2 PALPITATIONS: Primary | ICD-10-CM

## 2020-02-19 NOTE — TELEPHONE ENCOUNTER
L/m for pt. I will try to call her tomorrow morning.  ----- Message from Frances Magdaleno sent at 2/19/2020  4:46 PM CST -----  Contact: Patient  Marley, The pt is returning a call. Please call her back @ [phone]. Thanks, Frances

## 2020-03-10 ENCOUNTER — TELEPHONE (OUTPATIENT)
Dept: ELECTROPHYSIOLOGY | Facility: CLINIC | Age: 45
End: 2020-03-10

## 2020-03-11 ENCOUNTER — OFFICE VISIT (OUTPATIENT)
Dept: ELECTROPHYSIOLOGY | Facility: CLINIC | Age: 45
End: 2020-03-11
Payer: COMMERCIAL

## 2020-03-11 VITALS
BODY MASS INDEX: 21.74 KG/M2 | WEIGHT: 130.5 LBS | HEIGHT: 65 IN | SYSTOLIC BLOOD PRESSURE: 96 MMHG | HEART RATE: 80 BPM | DIASTOLIC BLOOD PRESSURE: 68 MMHG

## 2020-03-11 DIAGNOSIS — R00.2 PALPITATIONS: Primary | ICD-10-CM

## 2020-03-11 DIAGNOSIS — I49.3 PREMATURE VENTRICULAR CONTRACTIONS (PVCS) (VPCS): ICD-10-CM

## 2020-03-11 PROCEDURE — 99204 PR OFFICE/OUTPT VISIT, NEW, LEVL IV, 45-59 MIN: ICD-10-PCS | Mod: S$GLB,,, | Performed by: INTERNAL MEDICINE

## 2020-03-11 PROCEDURE — 99999 PR PBB SHADOW E&M-EST. PATIENT-LVL III: ICD-10-PCS | Mod: PBBFAC,,, | Performed by: INTERNAL MEDICINE

## 2020-03-11 PROCEDURE — 99999 PR PBB SHADOW E&M-EST. PATIENT-LVL III: CPT | Mod: PBBFAC,,, | Performed by: INTERNAL MEDICINE

## 2020-03-11 PROCEDURE — 93005 RHYTHM STRIP: ICD-10-PCS | Mod: S$GLB,,, | Performed by: INTERNAL MEDICINE

## 2020-03-11 PROCEDURE — 93005 ELECTROCARDIOGRAM TRACING: CPT | Mod: S$GLB,,, | Performed by: INTERNAL MEDICINE

## 2020-03-11 PROCEDURE — 93010 RHYTHM STRIP: ICD-10-PCS | Mod: S$GLB,,, | Performed by: INTERNAL MEDICINE

## 2020-03-11 PROCEDURE — 93010 ELECTROCARDIOGRAM REPORT: CPT | Mod: S$GLB,,, | Performed by: INTERNAL MEDICINE

## 2020-03-11 PROCEDURE — 99204 OFFICE O/P NEW MOD 45 MIN: CPT | Mod: S$GLB,,, | Performed by: INTERNAL MEDICINE

## 2020-03-11 NOTE — PROGRESS NOTES
Subjective:    Patient ID:  Zara Leal is a 44 y.o. female who presents for evaluation of Premature ventricular contractions    HPI  Ms Leal is a 44 year old lady with palpitations and VPCs who presents to our arrhythmia clinic today for evaluation of her VPCs. She has been seen previously by Dr Garcia for this in 2014. At that time, an echo was performed which showed preserved LV systolic function and no obvious structural heart disease. She also wore a 24 hour holter monitor in 2014 which showed monomorphic VPCs totaling 1112 in 24 hours. She has symptomatic episodes almost daily. Symptoms generally include the sensation of skipped heart beats followed by 1-2 hard heart beats described as a thumping sensation. She does not drink alcohol or caffeine. She does not smoke or use illicit drugs. She has no syncope or presyncope associated with her palpitations. She has no significant shortness of breath. She may feel slight chest discomfort during the episodes but nothing that is limiting or severe. She also describes having an involuntary cough along with episodes but notes that this does not always happen. The frequency and duration of episodes are also variable. Sometimes she will have several episodes over the course of a couple minutes. At other times, she may have one or less episodes per hour on average. Rarely, she will awake (around 5 am) with palpitations. She has not checked her heart rate during said episodes. She denies diaphoresis and chills. Her episodes happen independent of and are not influenced or otherwise impacted by menstruation. She does have a stressful job which may impact her frequency and severity of symptoms.     I have reviewed today's ECG in clinic. It shows sinus rhythm with no ectopy.    Review of Systems   Constitution: Negative for chills, decreased appetite, diaphoresis, malaise/fatigue and weight loss.   HENT: Negative for congestion, hearing loss, nosebleeds and  sore throat.    Eyes: Negative for pain, redness and visual disturbance.   Cardiovascular: Positive for palpitations. Negative for chest pain, dyspnea on exertion, irregular heartbeat, leg swelling, orthopnea and syncope.   Respiratory: Negative for cough, shortness of breath, sleep disturbances due to breathing and wheezing.    Endocrine: Negative for cold intolerance and heat intolerance.   Hematologic/Lymphatic: Negative for bleeding problem. Does not bruise/bleed easily.   Skin: Negative for color change, dry skin, poor wound healing and rash.   Musculoskeletal: Negative for arthritis, back pain, falls, joint pain, muscle weakness and myalgias.   Gastrointestinal: Negative for abdominal pain, change in bowel habit, constipation, diarrhea, hematochezia, melena and vomiting.   Genitourinary: Negative for dysuria, frequency, hematuria, nocturia and urgency.   Neurological: Positive for light-headedness (rare, associated with menstruation). Negative for difficulty with concentration, disturbances in coordination, dizziness, focal weakness, headaches, numbness and weakness.   Psychiatric/Behavioral: Negative for altered mental status. The patient does not have insomnia.         Objective:    Physical Exam   Constitutional: She is oriented to person, place, and time. She appears well-developed and well-nourished. No distress.   HENT:   Head: Normocephalic and atraumatic.   Eyes: Conjunctivae and EOM are normal. Right eye exhibits no discharge. Left eye exhibits no discharge.   Neck: Neck supple. No JVD present.   Cardiovascular: Normal rate, regular rhythm, normal heart sounds and intact distal pulses.   No murmur heard.  Pulmonary/Chest: Effort normal and breath sounds normal. No respiratory distress. She has no wheezes. She has no rales. She exhibits no tenderness.   Abdominal: Soft. She exhibits no distension. There is no tenderness.   Musculoskeletal: Normal range of motion. She exhibits no edema.   Neurological:  She is alert and oriented to person, place, and time.   Skin: Skin is warm and dry.   Vitals reviewed.        Assessment:       1. Palpitations    2. Premature ventricular contractions (PVCs) (VPCs)         Plan:       Ms Leal is a 44 year old lady with palpitations and VPCs who presents to our arrhythmia clinic today for evaluation of her VPCs. She has been seen previously by Dr Garcia for this in 2014.      1. VPCs, Palpitations  - will order an event monitor to quantify VPC burden  - last echo was 6 years ago. Will up date echocardiogram to confirm no decrement or change in LV systolic function.   - pending results of event monitor and echocardiogram, we will offer trial of beta blocker therapy (metoprolol tartrate 25 mg po bid)    Return to clinic in one year or earlier as needed for any new or worsening symptoms.

## 2020-03-18 ENCOUNTER — CLINICAL SUPPORT (OUTPATIENT)
Dept: CARDIOLOGY | Facility: HOSPITAL | Age: 45
End: 2020-03-18
Attending: STUDENT IN AN ORGANIZED HEALTH CARE EDUCATION/TRAINING PROGRAM
Payer: COMMERCIAL

## 2020-03-18 DIAGNOSIS — I49.3 PREMATURE VENTRICULAR CONTRACTIONS (PVCS) (VPCS): ICD-10-CM

## 2020-03-18 PROCEDURE — 93271 ECG/MONITORING AND ANALYSIS: CPT

## 2020-04-07 ENCOUNTER — DOCUMENTATION ONLY (OUTPATIENT)
Dept: CARDIOLOGY | Facility: HOSPITAL | Age: 45
End: 2020-04-07

## 2020-04-07 NOTE — PROGRESS NOTES
Received a call from [employer] on yesterday (4/6/20) stating that the pt was to contact that office in relation to setting up her 30 day event monitor.  Pt failed to call them.  Pt is to contact [employer] or this office within 3 business days or the monitor will be cancelled.

## 2020-10-05 ENCOUNTER — TELEPHONE (OUTPATIENT)
Dept: ELECTROPHYSIOLOGY | Facility: CLINIC | Age: 45
End: 2020-10-05

## 2020-10-05 NOTE — TELEPHONE ENCOUNTER
Patient rec'd 30 day monitor back in April and never wore it. She still has it and is starting to feel the palpitations again. She called a number on the box and was told she needed to call the MD office to the order renewed. She tried to call the device team and was told she needed to speak with a nurse.     What do we need to do to get the 30 day monitor initiated again?

## 2020-10-05 NOTE — TELEPHONE ENCOUNTER
----- Message from Radha Butler MA sent at 10/5/2020  8:13 AM CDT -----  Please call the patient at [phone] she have some question about her monitor that was mail to her home she said she talk to someone in the device department they told her to call this department. It's been 30 days she have it at home. Thank you.

## 2020-10-05 NOTE — TELEPHONE ENCOUNTER
----- Message from Radha Butler MA sent at 10/5/2020  8:13 AM CDT -----  Please call the patient at [phone] she have some question about her monitor that was mail to her home she said she talk to someone in the device department they told her to call this department. It's been 30 days she have it at home. Thank you.

## 2020-10-05 NOTE — TELEPHONE ENCOUNTER
----- Message from Lindsay Escobedo sent at 10/5/2020 10:57 AM CDT -----  Regarding: Call  Pt returning your call. Thanks     [phone]

## 2020-10-06 ENCOUNTER — CLINICAL SUPPORT (OUTPATIENT)
Dept: CARDIOLOGY | Facility: HOSPITAL | Age: 45
End: 2020-10-06
Attending: INTERNAL MEDICINE
Payer: COMMERCIAL

## 2020-10-06 DIAGNOSIS — R00.2 PALPITATIONS: Primary | ICD-10-CM

## 2020-10-06 DIAGNOSIS — R00.2 PALPITATIONS: ICD-10-CM

## 2020-10-06 PROCEDURE — 93271 ECG/MONITORING AND ANALYSIS: CPT

## 2020-10-06 PROCEDURE — 93272 CARDIAC EVENT MONITOR (CUPID ONLY): ICD-10-PCS | Mod: ,,, | Performed by: INTERNAL MEDICINE

## 2020-10-06 PROCEDURE — 93272 ECG/REVIEW INTERPRET ONLY: CPT | Mod: ,,, | Performed by: INTERNAL MEDICINE

## 2020-10-11 ENCOUNTER — OFFICE VISIT (OUTPATIENT)
Dept: URGENT CARE | Facility: CLINIC | Age: 45
End: 2020-10-11
Payer: COMMERCIAL

## 2020-10-11 VITALS
OXYGEN SATURATION: 98 % | DIASTOLIC BLOOD PRESSURE: 61 MMHG | TEMPERATURE: 98 F | SYSTOLIC BLOOD PRESSURE: 92 MMHG | HEART RATE: 74 BPM

## 2020-10-11 DIAGNOSIS — S05.02XA ABRASION OF LEFT CORNEA, INITIAL ENCOUNTER: Primary | ICD-10-CM

## 2020-10-11 PROCEDURE — 99213 PR OFFICE/OUTPT VISIT, EST, LEVL III, 20-29 MIN: ICD-10-PCS | Mod: S$GLB,,, | Performed by: NURSE PRACTITIONER

## 2020-10-11 PROCEDURE — 99213 OFFICE O/P EST LOW 20 MIN: CPT | Mod: S$GLB,,, | Performed by: NURSE PRACTITIONER

## 2020-10-11 RX ORDER — OFLOXACIN 3 MG/ML
1 SOLUTION/ DROPS OPHTHALMIC 4 TIMES DAILY
Qty: 5 ML | Refills: 0 | Status: SHIPPED | OUTPATIENT
Start: 2020-10-11 | End: 2020-10-18

## 2020-10-11 NOTE — PROGRESS NOTES
Subjective:       Patient ID: Zara Leal is a 45 y.o. female.    Vitals:  temperature is 98.3 °F (36.8 °C). Her blood pressure is 92/61 and her pulse is 74. Her oxygen saturation is 98%.     Chief Complaint: Eye Injury    Pt presents with complaint of left eye injury.  Pt states that she was gardening and leaned forward and a stick pierced her eye.  Pt says her eye did not bleed and has not had any discharge. Pt states she tried using eye drops yesterday but states that it burned too much    Eye Injury   The left eye is affected. This is a new problem. The current episode started yesterday. The problem occurs constantly. The problem has been gradually worsening. The injury mechanism was a foreign body. The pain is at a severity of 8/10. The pain is moderate. There is no known exposure to pink eye. She does not wear contacts. Associated symptoms include eye redness, a foreign body sensation, itching and nausea. Pertinent negatives include no blurred vision, eye discharge, double vision, fever, photophobia or vomiting. She has tried eye drops (tylenol) for the symptoms.       Constitution: Negative for chills and fever.   HENT: Negative for congestion and sinus pain.    Eyes: Positive for eye trauma, eye itching, eye pain and eye redness. Negative for foreign body in eye, eye discharge, photophobia, vision loss, double vision, blurred vision and eyelid swelling.   Gastrointestinal: Positive for nausea. Negative for vomiting.   Genitourinary: Negative for history of kidney stones.   Skin: Negative for rash.   Allergic/Immunologic: Negative for seasonal allergies and itching.   Neurological: Positive for headaches.       Objective:      Physical Exam   Constitutional: She is oriented to person, place, and time. She appears well-developed.   HENT:   Head: Normocephalic and atraumatic.   Ears:   Right Ear: External ear normal.   Left Ear: External ear normal.   Nose: Nose normal.   Mouth/Throat: Oropharynx  is clear and moist.   Eyes: Pupils are equal, round, and reactive to light. Conjunctivae, EOM and lids are normal. Lids are everted and swept, no foreign bodies found. Right eye exhibits no discharge. No foreign body present in the right eye. Left eye exhibits no discharge. No foreign body present in the left eye. Right conjunctiva is not injected. Left conjunctiva is not injected. Right eye exhibits normal extraocular motion. Left eye exhibits normal extraocular motion.   Slit lamp exam:       The left eye shows fluorescein uptake.     vision grossly intact  Neck: Trachea normal, full passive range of motion without pain and phonation normal. Neck supple.   Musculoskeletal: Normal range of motion.   Neurological: She is alert and oriented to person, place, and time.   Skin: Skin is warm, dry and intact. Psychiatric: Her speech is normal and behavior is normal. Judgment and thought content normal.   Nursing note and vitals reviewed.         Visual Acuity Screening    Right eye Left eye Both eyes   Without correction: 20/15  20/15   With correction:  20/20          Assessment:       1. Abrasion of left cornea, initial encounter        Plan:         Abrasion of left cornea, initial encounter  -     ofloxacin (OCUFLOX) 0.3 % ophthalmic solution; Place 1 drop into the left eye 4 (four) times daily. for 7 days  Dispense: 5 mL; Refill: 0  -     Ambulatory referral/consult to Ophthalmology

## 2020-10-11 NOTE — PATIENT INSTRUCTIONS
Return to Urgent Care or go to ER if symptoms worsen or fail to improve.  Follow up with PCP as recommended for further management.     You have been referred to Ophthalmology for further management.  Scheduling should contact you  to make an appointment.  If you do not hear from anyone, please call [phone] to schedule an Urgent appointment with Ophthalmology.      Corneal Abrasion    You have received a scratch or scrape (abrasion) to your cornea. The cornea is the clear part in the front of the eye. This sensitive area is very painful when injured. You may make tears frequently, and your vision may be blurry until the injury heals. You may be sensitive to light.  This part of the body heals quickly. You can expect the pain to go away within 24 to 48 hours. If the abrasion is large or deep, your doctor may apply an eye patch, although this is not always done. An antibiotic ointment or eye drops may also be used to prevent infection.  Numbing drops may be used to relieve the pain temporarily so that your eyes can be examined. However, these drops cannot be prescribed for home use because that would prevent healing and lead to more serious problems. Also, if you cant feel your eye, there is a chance of accidentally injuring it further without knowing it.  Home care  · A cold pack (ice in a plastic bag, wrapped in a towel) may be applied over the eye (or eye patch) for 20 minutes at a time, to reduce pain.  · You may use acetaminophen or ibuprofen to control pain, unless another pain medicine was prescribed. Note: If you have chronic liver or kidney disease or ever had a stomach ulcer or GI bleeding, talk with your doctor before using these medicines.  · Rest your eyes and do not read until symptoms are gone.  · If you use contact lenses, do not wear them until all symptoms are gone.  · If your vision is affected by the corneal abrasion or if an eye patch was applied, do not drive a motor vehicle or operate  machinery until all symptoms are gone. You may have trouble judging distances using only one eye.  · If your eyes are sensitive to light, try wearing sunglasses, or stay indoors until symptoms go away.  Follow-up care  Follow up with your health care provider, or as advised.  · If no patch was put on your eye, and used but the pain continues for more than 48 hours, you should have another exam. Return to this facility or contact your health care provider to arrange this.  · If your eye was patched and you were asked to remove the patch yourself, see your health care provider. You may also return to this facility if you still have pain after the patch is removed.  · If you were given a return appointment for patch removal and re-examination, be sure to keep the appointment. Leaving the patch in place longer than advised could be harmful.  When to seek medical advice  Call your health care provider right away if any of these occur.  · Increasing eye pain or pain that does not improve after 24 hours  · Discharge from the eye  · Increasing redness of the eye or swelling of the eyelids  · Worsening vision  · Symptoms that worsen after the abrasion has healed  Date Last Reviewed: 6/14/2015  © 9332-2419 [employer]. [address] [place] [address], [place] [address]. All rights reserved. This information is not intended as a substitute for professional medical care. Always follow your healthcare professional's instructions.

## 2020-10-13 ENCOUNTER — TELEPHONE (OUTPATIENT)
Dept: FAMILY MEDICINE | Facility: CLINIC | Age: 45
End: 2020-10-13

## 2020-10-13 DIAGNOSIS — S05.90XA EYE INJURY, UNSPECIFIED LATERALITY, INITIAL ENCOUNTER: Primary | ICD-10-CM

## 2020-10-13 NOTE — TELEPHONE ENCOUNTER
Several phone conversations with patient.  She had a branch hit her in the eye.  She has a foreign body sensation and had significant swelling of the eyelids and surrounding structures.  She was advised to go to urgent care on Sunday.  She was seen by the nurse practitioner and there was some uptake on the cornea.  She was given some antibiotic drops.  As of Tuesday she has not heard about an appointment and it does appear there was a referral to Ophthalmology or similar.  I will put in an urgent referral since as of Tuesday at 6:00 p.m. no appointment has been set.  She has had no worsening of vision a continues to have discomfort in the eye.

## 2020-10-14 ENCOUNTER — TELEPHONE (OUTPATIENT)
Dept: OPHTHALMOLOGY | Facility: CLINIC | Age: 45
End: 2020-10-14

## 2020-10-14 NOTE — TELEPHONE ENCOUNTER
Left message for patient to call to discuss current ocular issues and concerns.  Informed patient that we will be happy to schedule any testing and appointments as needed.   Told pt to call back at [phone]/ech

## 2020-10-14 NOTE — TELEPHONE ENCOUNTER
Left message for patient to call to discuss current ocular issues and concerns.  Informed patient that we will be happy to schedule any testing and appointments as needed.   Told pt to call back at [phone]/ech

## 2020-10-16 ENCOUNTER — OFFICE VISIT (OUTPATIENT)
Dept: OPHTHALMOLOGY | Facility: CLINIC | Age: 45
End: 2020-10-16
Attending: OPHTHALMOLOGY
Payer: COMMERCIAL

## 2020-10-16 DIAGNOSIS — S05.02XA ABRASION OF LEFT CORNEA, INITIAL ENCOUNTER: Primary | ICD-10-CM

## 2020-10-16 PROCEDURE — 99999 PR PBB SHADOW E&M-EST. PATIENT-LVL III: ICD-10-PCS | Mod: PBBFAC,,, | Performed by: OPHTHALMOLOGY

## 2020-10-16 PROCEDURE — 92002 INTRM OPH EXAM NEW PATIENT: CPT | Mod: S$GLB,,, | Performed by: OPHTHALMOLOGY

## 2020-10-16 PROCEDURE — 92002 PR EYE EXAM, NEW PATIENT,INTERMED: ICD-10-PCS | Mod: S$GLB,,, | Performed by: OPHTHALMOLOGY

## 2020-10-16 PROCEDURE — 99999 PR PBB SHADOW E&M-EST. PATIENT-LVL III: CPT | Mod: PBBFAC,,, | Performed by: OPHTHALMOLOGY

## 2020-10-16 NOTE — PROGRESS NOTES
HPI     Patient here for a corneal Abrasion follow-up OS.     Patient went to Urgent care 10/11/2020 for injury to the left eye. Patient   reports that OS is feeling better but still having some irritation with   blurred vision.     Drops:  Oflox QID OS       Last edited by Adair Flores on 10/16/2020 10:28 AM. (History)            Assessment /Plan     For exam results, see Encounter Report.    Abrasion of left cornea, initial encounter      Healed  Normal exam

## 2020-10-16 NOTE — LETTER
October 16, 2020      Gabrielle Almanza, DEXTER  [address] [facility] [address]           [facility]  [address] [facility] [address]  Phone: [phone]  Fax: [phone]          Patient: Zara Leal   MR Number: [id number]   YOB: 1975   Date of Visit: 10/16/2020       Dear Gabrielle Almanza:    Thank you for referring Zara Leal to me for evaluation. Attached you will find relevant portions of my assessment and plan of care.    If you have questions, please do not hesitate to call me. I look forward to following Zara Leal along with you.    Sincerely,    Khadijah Murphy MD    Enclosure  CC:  No Recipients    If you would like to receive this communication electronically, please contact [email or web address] or [phone] to request more information on [employer] access.    For providers and/or their staff who would like to refer a patient to [facility], please contact us through our one-stop-shop provider referral line, [facility], at [phone].    If you feel you have received this communication in error or would no longer like to receive these types of communications, please e-mail [email or web address]

## 2020-11-16 ENCOUNTER — TELEPHONE (OUTPATIENT)
Dept: ELECTROPHYSIOLOGY | Facility: CLINIC | Age: 45
End: 2020-11-16

## 2020-11-16 NOTE — TELEPHONE ENCOUNTER
Spoke with pt to relay results.  She requested our clinic number to contact us later to discuss further.   ----- Message from Maryana Headley RN sent at 11/16/2020 11:40 AM CST -----  Can you please call to relay results?  Thanks  ----- Message -----  From: Frank Garcia MD  Sent: 11/16/2020   6:52 AM CST  To: Maryana Headley RN    Monitor revealed no significant arrhythmias. Please relay to patient

## 2021-03-26 ENCOUNTER — LAB VISIT (OUTPATIENT)
Dept: LAB | Facility: HOSPITAL | Age: 46
End: 2021-03-26
Attending: INTERNAL MEDICINE
Payer: COMMERCIAL

## 2021-03-26 ENCOUNTER — TELEPHONE (OUTPATIENT)
Dept: FAMILY MEDICINE | Facility: CLINIC | Age: 46
End: 2021-03-26

## 2021-03-26 DIAGNOSIS — Z00.00 ROUTINE MEDICAL EXAM: ICD-10-CM

## 2021-03-26 DIAGNOSIS — Z00.00 ROUTINE MEDICAL EXAM: Primary | ICD-10-CM

## 2021-03-26 LAB
25(OH)D3+25(OH)D2 SERPL-MCNC: 35 NG/ML (ref 30–96)
ALBUMIN SERPL BCP-MCNC: 4 G/DL (ref 3.5–5.2)
ALP SERPL-CCNC: 55 U/L (ref 55–135)
ALT SERPL W/O P-5'-P-CCNC: 14 U/L (ref 10–44)
ANION GAP SERPL CALC-SCNC: 9 MMOL/L (ref 8–16)
AST SERPL-CCNC: 16 U/L (ref 10–40)
BASOPHILS # BLD AUTO: 0.03 K/UL (ref 0–0.2)
BASOPHILS NFR BLD: 0.6 % (ref 0–1.9)
BILIRUB SERPL-MCNC: 0.4 MG/DL (ref 0.1–1)
BUN SERPL-MCNC: 9 MG/DL (ref 6–20)
CALCIUM SERPL-MCNC: 8.8 MG/DL (ref 8.7–10.5)
CHLORIDE SERPL-SCNC: 105 MMOL/L (ref 95–110)
CO2 SERPL-SCNC: 26 MMOL/L (ref 23–29)
CREAT SERPL-MCNC: 0.8 MG/DL (ref 0.5–1.4)
DIFFERENTIAL METHOD: ABNORMAL
EOSINOPHIL # BLD AUTO: 0.1 K/UL (ref 0–0.5)
EOSINOPHIL NFR BLD: 2.1 % (ref 0–8)
ERYTHROCYTE [DISTWIDTH] IN BLOOD BY AUTOMATED COUNT: 16.7 % (ref 11.5–14.5)
EST. GFR  (AFRICAN AMERICAN): >60 ML/MIN/1.73 M^2
EST. GFR  (NON AFRICAN AMERICAN): >60 ML/MIN/1.73 M^2
FERRITIN SERPL-MCNC: 3 NG/ML (ref 20–300)
GLUCOSE SERPL-MCNC: 86 MG/DL (ref 70–110)
HCT VFR BLD AUTO: 33.5 % (ref 37–48.5)
HGB BLD-MCNC: 9.7 G/DL (ref 12–16)
IMM GRANULOCYTES # BLD AUTO: 0.01 K/UL (ref 0–0.04)
IMM GRANULOCYTES NFR BLD AUTO: 0.2 % (ref 0–0.5)
IRON SERPL-MCNC: 13 UG/DL (ref 30–160)
LYMPHOCYTES # BLD AUTO: 1.3 K/UL (ref 1–4.8)
LYMPHOCYTES NFR BLD: 24.4 % (ref 18–48)
MCH RBC QN AUTO: 22.2 PG (ref 27–31)
MCHC RBC AUTO-ENTMCNC: 29 G/DL (ref 32–36)
MCV RBC AUTO: 77 FL (ref 82–98)
MONOCYTES # BLD AUTO: 0.3 K/UL (ref 0.3–1)
MONOCYTES NFR BLD: 4.7 % (ref 4–15)
NEUTROPHILS # BLD AUTO: 3.6 K/UL (ref 1.8–7.7)
NEUTROPHILS NFR BLD: 68 % (ref 38–73)
NRBC BLD-RTO: 0 /100 WBC
PLATELET # BLD AUTO: 244 K/UL (ref 150–350)
PMV BLD AUTO: 9.6 FL (ref 9.2–12.9)
POTASSIUM SERPL-SCNC: 4.1 MMOL/L (ref 3.5–5.1)
PROT SERPL-MCNC: 6.9 G/DL (ref 6–8.4)
RBC # BLD AUTO: 4.37 M/UL (ref 4–5.4)
SATURATED IRON: 3 % (ref 20–50)
SODIUM SERPL-SCNC: 140 MMOL/L (ref 136–145)
TOTAL IRON BINDING CAPACITY: 505 UG/DL (ref 250–450)
TRANSFERRIN SERPL-MCNC: 341 MG/DL (ref 200–375)
TSH SERPL DL<=0.005 MIU/L-ACNC: 2.79 UIU/ML (ref 0.4–4)
WBC # BLD AUTO: 5.32 K/UL (ref 3.9–12.7)

## 2021-03-26 PROCEDURE — 84443 ASSAY THYROID STIM HORMONE: CPT | Performed by: INTERNAL MEDICINE

## 2021-03-26 PROCEDURE — 83540 ASSAY OF IRON: CPT | Performed by: INTERNAL MEDICINE

## 2021-03-26 PROCEDURE — 82728 ASSAY OF FERRITIN: CPT | Performed by: INTERNAL MEDICINE

## 2021-03-26 PROCEDURE — 82306 VITAMIN D 25 HYDROXY: CPT | Performed by: INTERNAL MEDICINE

## 2021-03-26 PROCEDURE — 36415 COLL VENOUS BLD VENIPUNCTURE: CPT | Performed by: INTERNAL MEDICINE

## 2021-03-26 PROCEDURE — 86769 SARS-COV-2 COVID-19 ANTIBODY: CPT | Performed by: INTERNAL MEDICINE

## 2021-03-26 PROCEDURE — 85025 COMPLETE CBC W/AUTO DIFF WBC: CPT | Performed by: INTERNAL MEDICINE

## 2021-03-26 PROCEDURE — 80053 COMPREHEN METABOLIC PANEL: CPT | Performed by: INTERNAL MEDICINE

## 2021-03-27 LAB — SARS-COV-2 IGG SERPLBLD QL IA.RAPID: NEGATIVE

## 2021-04-19 ENCOUNTER — TELEPHONE (OUTPATIENT)
Dept: FAMILY MEDICINE | Facility: CLINIC | Age: 46
End: 2021-04-19

## 2021-04-19 ENCOUNTER — LAB VISIT (OUTPATIENT)
Dept: LAB | Facility: HOSPITAL | Age: 46
End: 2021-04-19
Attending: INTERNAL MEDICINE
Payer: COMMERCIAL

## 2021-04-19 DIAGNOSIS — Z00.00 ROUTINE MEDICAL EXAM: Primary | ICD-10-CM

## 2021-04-19 DIAGNOSIS — Z00.00 ROUTINE MEDICAL EXAM: ICD-10-CM

## 2021-04-19 LAB
T3FREE SERPL-MCNC: 2.1 PG/ML (ref 2.3–4.2)
T4 FREE SERPL-MCNC: 0.91 NG/DL (ref 0.71–1.51)
TSH SERPL DL<=0.005 MIU/L-ACNC: 1.63 UIU/ML (ref 0.4–4)

## 2021-04-19 PROCEDURE — 84481 FREE ASSAY (FT-3): CPT | Performed by: INTERNAL MEDICINE

## 2021-04-19 PROCEDURE — 36415 COLL VENOUS BLD VENIPUNCTURE: CPT | Performed by: INTERNAL MEDICINE

## 2021-04-19 PROCEDURE — 84443 ASSAY THYROID STIM HORMONE: CPT | Performed by: INTERNAL MEDICINE

## 2021-04-19 PROCEDURE — 84439 ASSAY OF FREE THYROXINE: CPT | Performed by: INTERNAL MEDICINE

## 2021-04-26 ENCOUNTER — PATIENT MESSAGE (OUTPATIENT)
Dept: RESEARCH | Facility: HOSPITAL | Age: 46
End: 2021-04-26

## 2021-09-17 ENCOUNTER — OFFICE VISIT (OUTPATIENT)
Dept: OBSTETRICS AND GYNECOLOGY | Facility: CLINIC | Age: 46
End: 2021-09-17
Payer: COMMERCIAL

## 2021-09-17 VITALS
SYSTOLIC BLOOD PRESSURE: 100 MMHG | BODY MASS INDEX: 22.19 KG/M2 | DIASTOLIC BLOOD PRESSURE: 67 MMHG | HEIGHT: 65 IN | WEIGHT: 133.19 LBS

## 2021-09-17 DIAGNOSIS — L73.9 FOLLICULITIS: Primary | ICD-10-CM

## 2021-09-17 PROCEDURE — 3008F PR BODY MASS INDEX (BMI) DOCUMENTED: ICD-10-PCS | Mod: CPTII,S$GLB,, | Performed by: PHYSICIAN ASSISTANT

## 2021-09-17 PROCEDURE — 87070 CULTURE OTHR SPECIMN AEROBIC: CPT | Performed by: PHYSICIAN ASSISTANT

## 2021-09-17 PROCEDURE — 1160F RVW MEDS BY RX/DR IN RCRD: CPT | Mod: CPTII,S$GLB,, | Performed by: PHYSICIAN ASSISTANT

## 2021-09-17 PROCEDURE — 99213 PR OFFICE/OUTPT VISIT, EST, LEVL III, 20-29 MIN: ICD-10-PCS | Mod: S$GLB,,, | Performed by: PHYSICIAN ASSISTANT

## 2021-09-17 PROCEDURE — 3074F PR MOST RECENT SYSTOLIC BLOOD PRESSURE < 130 MM HG: ICD-10-PCS | Mod: CPTII,S$GLB,, | Performed by: PHYSICIAN ASSISTANT

## 2021-09-17 PROCEDURE — 3078F DIAST BP <80 MM HG: CPT | Mod: CPTII,S$GLB,, | Performed by: PHYSICIAN ASSISTANT

## 2021-09-17 PROCEDURE — 1160F PR REVIEW ALL MEDS BY PRESCRIBER/CLIN PHARMACIST DOCUMENTED: ICD-10-PCS | Mod: CPTII,S$GLB,, | Performed by: PHYSICIAN ASSISTANT

## 2021-09-17 PROCEDURE — 3074F SYST BP LT 130 MM HG: CPT | Mod: CPTII,S$GLB,, | Performed by: PHYSICIAN ASSISTANT

## 2021-09-17 PROCEDURE — 1159F MED LIST DOCD IN RCRD: CPT | Mod: CPTII,S$GLB,, | Performed by: PHYSICIAN ASSISTANT

## 2021-09-17 PROCEDURE — 3008F BODY MASS INDEX DOCD: CPT | Mod: CPTII,S$GLB,, | Performed by: PHYSICIAN ASSISTANT

## 2021-09-17 PROCEDURE — 99213 OFFICE O/P EST LOW 20 MIN: CPT | Mod: S$GLB,,, | Performed by: PHYSICIAN ASSISTANT

## 2021-09-17 PROCEDURE — 1159F PR MEDICATION LIST DOCUMENTED IN MEDICAL RECORD: ICD-10-PCS | Mod: CPTII,S$GLB,, | Performed by: PHYSICIAN ASSISTANT

## 2021-09-17 PROCEDURE — 3078F PR MOST RECENT DIASTOLIC BLOOD PRESSURE < 80 MM HG: ICD-10-PCS | Mod: CPTII,S$GLB,, | Performed by: PHYSICIAN ASSISTANT

## 2021-09-17 RX ORDER — MUPIROCIN 20 MG/G
OINTMENT TOPICAL 2 TIMES DAILY
Qty: 15 G | Refills: 0 | Status: ON HOLD | OUTPATIENT
Start: 2021-09-17 | End: 2023-06-02 | Stop reason: HOSPADM

## 2021-09-20 LAB — BACTERIA SPEC AEROBE CULT: NO GROWTH

## 2021-11-03 ENCOUNTER — TELEPHONE (OUTPATIENT)
Dept: FAMILY MEDICINE | Facility: CLINIC | Age: 46
End: 2021-11-03
Payer: COMMERCIAL

## 2021-11-03 RX ORDER — ACYCLOVIR 400 MG/1
TABLET ORAL
Qty: 30 TABLET | Refills: 12 | Status: SHIPPED | OUTPATIENT
Start: 2021-11-03 | End: 2023-04-19 | Stop reason: SDUPTHER

## 2021-11-24 ENCOUNTER — OFFICE VISIT (OUTPATIENT)
Dept: OBSTETRICS AND GYNECOLOGY | Facility: CLINIC | Age: 46
End: 2021-11-24
Payer: COMMERCIAL

## 2021-11-24 VITALS
DIASTOLIC BLOOD PRESSURE: 66 MMHG | HEIGHT: 65 IN | BODY MASS INDEX: 22.06 KG/M2 | WEIGHT: 132.38 LBS | SYSTOLIC BLOOD PRESSURE: 94 MMHG

## 2021-11-24 DIAGNOSIS — Z01.419 ROUTINE GYNECOLOGICAL EXAMINATION: Primary | ICD-10-CM

## 2021-11-24 DIAGNOSIS — Z12.31 SCREENING MAMMOGRAM, ENCOUNTER FOR: ICD-10-CM

## 2021-11-24 DIAGNOSIS — N92.0 MENORRHAGIA WITH REGULAR CYCLE: ICD-10-CM

## 2021-11-24 DIAGNOSIS — R10.2 PELVIC PAIN: ICD-10-CM

## 2021-11-24 LAB
BILIRUB UR QL STRIP: NEGATIVE
CLARITY UR REFRACT.AUTO: CLEAR
COLOR UR AUTO: YELLOW
GLUCOSE UR QL STRIP: NEGATIVE
HGB UR QL STRIP: NEGATIVE
KETONES UR QL STRIP: NEGATIVE
LEUKOCYTE ESTERASE UR QL STRIP: NEGATIVE
NITRITE UR QL STRIP: NEGATIVE
PH UR STRIP: 5 [PH] (ref 5–8)
PROT UR QL STRIP: NEGATIVE
SP GR UR STRIP: 1.02 (ref 1–1.03)
URN SPEC COLLECT METH UR: NORMAL

## 2021-11-24 PROCEDURE — 99396 PR PREVENTIVE VISIT,EST,40-64: ICD-10-PCS | Mod: S$GLB,,, | Performed by: PHYSICIAN ASSISTANT

## 2021-11-24 PROCEDURE — 87624 HPV HI-RISK TYP POOLED RSLT: CPT | Performed by: PHYSICIAN ASSISTANT

## 2021-11-24 PROCEDURE — 88175 CYTOPATH C/V AUTO FLUID REDO: CPT | Performed by: PHYSICIAN ASSISTANT

## 2021-11-24 PROCEDURE — 87086 URINE CULTURE/COLONY COUNT: CPT | Performed by: PHYSICIAN ASSISTANT

## 2021-11-24 PROCEDURE — 81003 URINALYSIS AUTO W/O SCOPE: CPT | Performed by: PHYSICIAN ASSISTANT

## 2021-11-24 PROCEDURE — 99396 PREV VISIT EST AGE 40-64: CPT | Mod: S$GLB,,, | Performed by: PHYSICIAN ASSISTANT

## 2021-11-26 ENCOUNTER — TELEPHONE (OUTPATIENT)
Dept: OBSTETRICS AND GYNECOLOGY | Facility: CLINIC | Age: 46
End: 2021-11-26
Payer: COMMERCIAL

## 2021-11-26 DIAGNOSIS — R10.2 PELVIC PAIN: Primary | ICD-10-CM

## 2021-11-30 LAB
CLINICAL INFO: NORMAL
CYTO CVX: NORMAL
CYTOLOGIST CVX/VAG CYTO: NORMAL
CYTOLOGIST CVX/VAG CYTO: NORMAL
CYTOLOGY CMNT CVX/VAG CYTO-IMP: NORMAL
CYTOLOGY PAP THIN PREP EXPLANATION: NORMAL
DATE OF PREVIOUS PAP: NO
DATE PREVIOUS BX: NO
GEN CATEG CVX/VAG CYTO-IMP: NORMAL
HPV I/H RISK 4 DNA CVX QL NAA+PROBE: NOT DETECTED
LMP START DATE: NORMAL
MICROORGANISM CVX/VAG CYTO: NORMAL
PATHOLOGIST CVX/VAG CYTO: NORMAL
SERVICE CMNT-IMP: NORMAL
SPECIMEN SOURCE CVX/VAG CYTO: NORMAL
STAT OF ADQ CVX/VAG CYTO-IMP: NORMAL

## 2021-12-01 ENCOUNTER — TELEPHONE (OUTPATIENT)
Dept: OBSTETRICS AND GYNECOLOGY | Facility: CLINIC | Age: 46
End: 2021-12-01
Payer: COMMERCIAL

## 2021-12-02 ENCOUNTER — TELEPHONE (OUTPATIENT)
Dept: OBSTETRICS AND GYNECOLOGY | Facility: CLINIC | Age: 46
End: 2021-12-02
Payer: COMMERCIAL

## 2021-12-29 ENCOUNTER — HOSPITAL ENCOUNTER (OUTPATIENT)
Dept: RADIOLOGY | Facility: OTHER | Age: 46
Discharge: HOME | End: 2021-12-29
Attending: PHYSICIAN ASSISTANT
Payer: COMMERCIAL

## 2021-12-29 DIAGNOSIS — Z12.31 SCREENING MAMMOGRAM, ENCOUNTER FOR: ICD-10-CM

## 2021-12-29 DIAGNOSIS — N92.0 MENORRHAGIA WITH REGULAR CYCLE: ICD-10-CM

## 2021-12-29 PROCEDURE — 77063 MAMMO DIGITAL SCREENING BILAT WITH TOMO: ICD-10-PCS | Mod: 26,,, | Performed by: RADIOLOGY

## 2021-12-29 PROCEDURE — 76856 US EXAM PELVIC COMPLETE: CPT | Mod: 26,,, | Performed by: RADIOLOGY

## 2021-12-29 PROCEDURE — 77067 SCR MAMMO BI INCL CAD: CPT | Mod: 26,,, | Performed by: RADIOLOGY

## 2021-12-29 PROCEDURE — 76856 US PELVIS COMP WITH TRANSVAG NON-OB (XPD): ICD-10-PCS | Mod: 26,,, | Performed by: RADIOLOGY

## 2021-12-29 PROCEDURE — 77067 MAMMO DIGITAL SCREENING BILAT WITH TOMO: ICD-10-PCS | Mod: 26,,, | Performed by: RADIOLOGY

## 2021-12-29 PROCEDURE — 77063 BREAST TOMOSYNTHESIS BI: CPT | Mod: 26,,, | Performed by: RADIOLOGY

## 2021-12-29 PROCEDURE — 77067 SCR MAMMO BI INCL CAD: CPT | Mod: TC

## 2021-12-29 PROCEDURE — 76856 US EXAM PELVIC COMPLETE: CPT | Mod: TC

## 2021-12-29 PROCEDURE — 76830 TRANSVAGINAL US NON-OB: CPT | Mod: 26,,, | Performed by: RADIOLOGY

## 2021-12-29 PROCEDURE — 76830 US PELVIS COMP WITH TRANSVAG NON-OB (XPD): ICD-10-PCS | Mod: 26,,, | Performed by: RADIOLOGY

## 2021-12-30 ENCOUNTER — TELEPHONE (OUTPATIENT)
Dept: OBSTETRICS AND GYNECOLOGY | Facility: CLINIC | Age: 46
End: 2021-12-30
Payer: COMMERCIAL

## 2021-12-30 DIAGNOSIS — N92.0 MENORRHAGIA WITH REGULAR CYCLE: Primary | ICD-10-CM

## 2021-12-30 RX ORDER — NORETHINDRONE ACETATE AND ETHINYL ESTRADIOL 1MG-20(21)
1 KIT ORAL DAILY
Qty: 30 TABLET | Refills: 11 | Status: SHIPPED | OUTPATIENT
Start: 2021-12-30 | End: 2023-01-02

## 2022-01-14 ENCOUNTER — LAB VISIT (OUTPATIENT)
Dept: PRIMARY CARE CLINIC | Facility: CLINIC | Age: 47
End: 2022-01-14
Payer: COMMERCIAL

## 2022-01-14 DIAGNOSIS — Z20.822 CONTACT WITH AND (SUSPECTED) EXPOSURE TO COVID-19: ICD-10-CM

## 2022-01-14 LAB
CTP QC/QA: YES
SARS-COV-2 AG RESP QL IA.RAPID: NEGATIVE

## 2022-01-14 PROCEDURE — 87811 SARS-COV-2 COVID19 W/OPTIC: CPT

## 2022-01-18 ENCOUNTER — TELEPHONE (OUTPATIENT)
Dept: FAMILY MEDICINE | Facility: CLINIC | Age: 47
End: 2022-01-18
Payer: COMMERCIAL

## 2022-01-18 RX ORDER — AZITHROMYCIN 250 MG/1
TABLET, FILM COATED ORAL
Qty: 6 TABLET | Refills: 0 | Status: ON HOLD | OUTPATIENT
Start: 2022-01-18 | End: 2023-06-02 | Stop reason: HOSPADM

## 2022-01-18 NOTE — TELEPHONE ENCOUNTER
Patient COVID positive.  Some tickle and cough related to that but primarily a worsening sore throat and she would feel as if it is something like strep throat.  Hard to swallow.  She did not see any exudate on her tonsils.  She had fever and body aches but those are going away.  We discussed coverage for Z-Josh in the event there is some secondary bacterial infection such as a strep throat.  Cepacol lozenges, fluids, Tylenol and ibuprofen all discussed.

## 2022-05-27 ENCOUNTER — TELEPHONE (OUTPATIENT)
Dept: OBSTETRICS AND GYNECOLOGY | Facility: CLINIC | Age: 47
End: 2022-05-27
Payer: COMMERCIAL

## 2022-05-27 DIAGNOSIS — R10.2 PELVIC PAIN: Primary | ICD-10-CM

## 2022-05-27 NOTE — TELEPHONE ENCOUNTER
Pt c/o sharp pelvic pain in the central area of pelvis  that comes and goes 7 out 10 on pain scale when it happens. Pt states it started yesterday morning took advil and it helped. Pt LMP 1 1/2 weeks ago. Pt denies urine, bowel or vaginal discharge. Advised pt spoke with Dr Chamberlain and she recommends getting pelvic ultrasound. Orders placed and pt scheduled

## 2022-05-27 NOTE — TELEPHONE ENCOUNTER
----- Message from Sofia Alston sent at 5/27/2022  1:13 PM CDT -----  Pt is calling in regards to sharp pains and tenderness  Pt can be contacted at [phone]

## 2022-06-03 ENCOUNTER — HOSPITAL ENCOUNTER (OUTPATIENT)
Dept: RADIOLOGY | Facility: OTHER | Age: 47
Discharge: HOME | End: 2022-06-03
Attending: OBSTETRICS & GYNECOLOGY
Payer: COMMERCIAL

## 2022-06-03 DIAGNOSIS — R10.2 PELVIC PAIN: ICD-10-CM

## 2022-06-03 PROCEDURE — 76830 US PELVIS COMP WITH TRANSVAG NON-OB (XPD): ICD-10-PCS | Mod: 26,,, | Performed by: RADIOLOGY

## 2022-06-03 PROCEDURE — 76830 TRANSVAGINAL US NON-OB: CPT | Mod: TC

## 2022-06-03 PROCEDURE — 76830 TRANSVAGINAL US NON-OB: CPT | Mod: 26,,, | Performed by: RADIOLOGY

## 2022-06-03 PROCEDURE — 76856 US EXAM PELVIC COMPLETE: CPT | Mod: 26,,, | Performed by: RADIOLOGY

## 2022-06-03 PROCEDURE — 76856 US PELVIS COMP WITH TRANSVAG NON-OB (XPD): ICD-10-PCS | Mod: 26,,, | Performed by: RADIOLOGY

## 2022-12-12 ENCOUNTER — TELEPHONE (OUTPATIENT)
Dept: OBSTETRICS AND GYNECOLOGY | Facility: CLINIC | Age: 47
End: 2022-12-12
Payer: COMMERCIAL

## 2022-12-12 NOTE — TELEPHONE ENCOUNTER
----- Message from Lisette Fung sent at 12/12/2022 12:16 PM CST -----  Name of Who is Calling:CHRISTINA COSTA [[id number]]              What is the request in detail:Requesting a call back to schedule annual appt.               Can the clinic reply by [facility]:              What Number to Call Back [facility] not [facility]:[phone]

## 2023-01-13 ENCOUNTER — PATIENT MESSAGE (OUTPATIENT)
Dept: FAMILY MEDICINE | Facility: CLINIC | Age: 48
End: 2023-01-13
Payer: COMMERCIAL

## 2023-01-13 DIAGNOSIS — Z12.11 COLON CANCER SCREENING: Primary | ICD-10-CM

## 2023-01-18 ENCOUNTER — OFFICE VISIT (OUTPATIENT)
Dept: OBSTETRICS AND GYNECOLOGY | Facility: CLINIC | Age: 48
End: 2023-01-18
Attending: OBSTETRICS & GYNECOLOGY
Payer: COMMERCIAL

## 2023-01-18 VITALS
HEART RATE: 74 BPM | HEIGHT: 65 IN | DIASTOLIC BLOOD PRESSURE: 65 MMHG | SYSTOLIC BLOOD PRESSURE: 98 MMHG | BODY MASS INDEX: 22.16 KG/M2 | WEIGHT: 133 LBS

## 2023-01-18 DIAGNOSIS — N92.1 BREAKTHROUGH BLEEDING ON OCPS: ICD-10-CM

## 2023-01-18 DIAGNOSIS — Z12.31 ENCOUNTER FOR SCREENING MAMMOGRAM FOR MALIGNANT NEOPLASM OF BREAST: ICD-10-CM

## 2023-01-18 DIAGNOSIS — N92.0 MENORRHAGIA WITH REGULAR CYCLE: ICD-10-CM

## 2023-01-18 DIAGNOSIS — Z01.419 ENCOUNTER FOR GYNECOLOGICAL EXAMINATION WITHOUT ABNORMAL FINDING: Primary | ICD-10-CM

## 2023-01-18 DIAGNOSIS — Z12.4 SCREENING FOR MALIGNANT NEOPLASM OF THE CERVIX: ICD-10-CM

## 2023-01-18 PROCEDURE — 3078F PR MOST RECENT DIASTOLIC BLOOD PRESSURE < 80 MM HG: ICD-10-PCS | Mod: CPTII,S$GLB,, | Performed by: OBSTETRICS & GYNECOLOGY

## 2023-01-18 PROCEDURE — 3074F PR MOST RECENT SYSTOLIC BLOOD PRESSURE < 130 MM HG: ICD-10-PCS | Mod: CPTII,S$GLB,, | Performed by: OBSTETRICS & GYNECOLOGY

## 2023-01-18 PROCEDURE — 99396 PREV VISIT EST AGE 40-64: CPT | Mod: S$GLB,,, | Performed by: OBSTETRICS & GYNECOLOGY

## 2023-01-18 PROCEDURE — 3008F BODY MASS INDEX DOCD: CPT | Mod: CPTII,S$GLB,, | Performed by: OBSTETRICS & GYNECOLOGY

## 2023-01-18 PROCEDURE — 3078F DIAST BP <80 MM HG: CPT | Mod: CPTII,S$GLB,, | Performed by: OBSTETRICS & GYNECOLOGY

## 2023-01-18 PROCEDURE — 99999 PR PBB SHADOW E&M-EST. PATIENT-LVL III: CPT | Mod: PBBFAC,,, | Performed by: OBSTETRICS & GYNECOLOGY

## 2023-01-18 PROCEDURE — 1159F PR MEDICATION LIST DOCUMENTED IN MEDICAL RECORD: ICD-10-PCS | Mod: CPTII,S$GLB,, | Performed by: OBSTETRICS & GYNECOLOGY

## 2023-01-18 PROCEDURE — 88175 CYTOPATH C/V AUTO FLUID REDO: CPT | Performed by: OBSTETRICS & GYNECOLOGY

## 2023-01-18 PROCEDURE — 99999 PR PBB SHADOW E&M-EST. PATIENT-LVL III: ICD-10-PCS | Mod: PBBFAC,,, | Performed by: OBSTETRICS & GYNECOLOGY

## 2023-01-18 PROCEDURE — 1159F MED LIST DOCD IN RCRD: CPT | Mod: CPTII,S$GLB,, | Performed by: OBSTETRICS & GYNECOLOGY

## 2023-01-18 PROCEDURE — 87624 HPV HI-RISK TYP POOLED RSLT: CPT | Performed by: OBSTETRICS & GYNECOLOGY

## 2023-01-18 PROCEDURE — 3074F SYST BP LT 130 MM HG: CPT | Mod: CPTII,S$GLB,, | Performed by: OBSTETRICS & GYNECOLOGY

## 2023-01-18 PROCEDURE — 3008F PR BODY MASS INDEX (BMI) DOCUMENTED: ICD-10-PCS | Mod: CPTII,S$GLB,, | Performed by: OBSTETRICS & GYNECOLOGY

## 2023-01-18 PROCEDURE — 99396 PR PREVENTIVE VISIT,EST,40-64: ICD-10-PCS | Mod: S$GLB,,, | Performed by: OBSTETRICS & GYNECOLOGY

## 2023-01-18 RX ORDER — NORETHINDRONE ACETATE AND ETHINYL ESTRADIOL 1MG-20(21)
1 KIT ORAL DAILY
Qty: 28 TABLET | Refills: 11 | Status: ON HOLD | OUTPATIENT
Start: 2023-01-18 | End: 2023-06-02 | Stop reason: HOSPADM

## 2023-01-18 NOTE — PROGRESS NOTES
SUBJECTIVE:   47 y.o. female   for annual routine Pap and checkup. Patient's last menstrual period was 2023..  She reports starting OCPs in November- her cycles are less heavy. She has had BTB and is on her 3rd pack. She has a history of a polyp.        Past Medical History:   Diagnosis Date    Endometriosis of pelvis 2005    PVC (premature ventricular contraction)      Past Surgical History:   Procedure Laterality Date    HYSTEROSCOPY WITH DILATION AND CURETTAGE OF UTERUS N/A 2019    Procedure: HYSTEROSCOPY, WITH DILATION AND CURETTAGE OF UTERUS;  Surgeon: Maryana Chamberlain MD;  Location: [facility];  Service: OB/GYN;  Laterality: N/A;    TONSILLECTOMY      uterus septate  2005    WISDOM TOOTH EXTRACTION       Social History     Socioeconomic History    Marital status:    Tobacco Use    Smoking status: Never    Smokeless tobacco: Never   Substance and Sexual Activity    Alcohol use: Not Currently    Drug use: No    Sexual activity: Yes     Partners: Male     Birth control/protection: Condom   Other Topics Concern    Are you pregnant or think you may be? No    Breast-feeding No     Family History   Problem Relation Age of Onset    Melanoma Paternal Grandfather     Breast cancer Paternal Grandmother 80    Melanoma Paternal Grandmother     Melanoma Father     Melanoma Sister     Ovarian cancer Neg Hx     Colon cancer Neg Hx     Cancer Neg Hx      OB History    Para Term  AB Living   3 2 2   1 2   SAB IAB Ectopic Multiple Live Births   1              # Outcome Date GA Lbr Alejandro/2nd Weight Sex Delivery Anes PTL Lv   3 SAB            2 Term      Vag-Spont      1 Term      Vag-Spont              Current Outpatient Medications   Medication Sig Dispense Refill    acyclovir (ZOVIRAX) 400 MG tablet 1 po TID  For 3-5 days prn fever blisters 30 tablet 12    ibuprofen (ADVIL ORAL) Take by mouth as needed.      azithromycin (ZITHROMAX Z-J LUIS) 250 MG tablet 2 pills on day 1, then 1 pill a  day 6 tablet 0    HYDROcodone-acetaminophen (NORCO) 5-325 mg per tablet Take 1 tablet by mouth every 6 (six) hours as needed for Pain. 7 tablet 0    ibuprofen (ADVIL,MOTRIN) 600 MG tablet Take 1 tablet (600 mg total) by mouth 3 (three) times daily. 30 tablet 0    mupirocin (BACTROBAN) 2 % ointment Apply topically 2 (two) times daily. 15 g 0    norethindrone-ethinyl estradiol (BLISOVI FE 1/20, 28,) 1 mg-20 mcg (21)/75 mg (7) per tablet Take 1 tablet by mouth once daily. 28 tablet 11     No current facility-administered medications for this visit.     Allergies: Patient has no known allergies.     The ASCVD Risk score (Aravind MCMULLEN, et al., 2019) failed to calculate for the following reasons:    Cannot find a previous HDL lab    Cannot find a previous total cholesterol lab      ROS:  Constitutional: no weight loss, weight gain, fever, fatigue  Eyes:  No vision changes, glasses/contacts  ENT/Mouth: No ulcers, sinus problems, ears ringing, headache  Cardiovascular: No inability to lie flat, chest pain, exercise intolerance, swelling, heart palpitations  Respiratory: No wheezing, coughing blood, shortness of breath, or cough  Gastrointestinal: No diarrhea, bloody stool, nausea/vomiting, constipation, gas, hemorrhoids  Genitourinary: No blood in urine, painful urination, urgency of urination, frequency of urination, incomplete emptying, incontinence, +abnormal bleeding, painful periods, heavy periods, vaginal discharge, vaginal odor, painful intercourse, sexual problems, bleeding after intercourse.  Musculoskeletal: No muscle weakness  Skin/Breast: No painful breasts, nipple discharge, masses, rash, ulcers  Neurological: No passing out, seizures, numbness, headache  Endocrine: No diabetes, hypothyroid, hyperthyroid, hot flashes, hair loss, abnormal hair growth, acne  Psychiatric: No depression, crying  Hematologic: No bruises, bleeding, swollen lymph nodes, anemia.      Physical Exam:   Constitutional: She is oriented to  person, place, and time. She appears well-developed and well-nourished.      Neck: No tracheal deviation present. No thyromegaly present.    Cardiovascular:       Exam reveals no edema.        Pulmonary/Chest: Effort normal. She exhibits no mass, no tenderness, no deformity and no retraction. Right breast exhibits no inverted nipple, no mass, no nipple discharge, no skin change, no tenderness, presence, no bleeding and no swelling. Left breast exhibits no inverted nipple, no mass, no nipple discharge, no skin change, no tenderness, presence, no bleeding and no swelling. Breasts are symmetrical.        Abdominal: Soft. She exhibits no distension and no mass. There is no abdominal tenderness. There is no rebound and no guarding. No hernia. Hernia confirmed negative in the left inguinal area.     Genitourinary:    Vagina and uterus normal.   Rectum:      No external hemorrhoid.   There is no rash, tenderness or lesion on the right labia. There is no rash, tenderness or lesion on the left labia. Cervix is normal. No no adexnal prolapse. Right adnexum displays no mass, no tenderness and no fullness. Left adnexum displays no mass, no tenderness and no fullness. No  no vaginal discharge, tenderness, bleeding, rectocele, cystocele or unspecified prolapse of vaginal walls in the vagina. Cervix exhibits no motion tenderness, no discharge and no friability. Uterus is not deviated.           Musculoskeletal: Normal range of motion and moves all extremeties. No edema.       Neurological: She is alert and oriented to person, place, and time.    Skin: No rash noted. No erythema. No pallor.    Psychiatric: She has a normal mood and affect. Her behavior is normal. Judgment and thought content normal.       ASSESSMENT:   well woman  BTB on OCP  PLAN:   mammogram  pap smear/HPV per patient request  Pelvic ultrasound  return annually or prn

## 2023-02-14 ENCOUNTER — TELEPHONE (OUTPATIENT)
Dept: FAMILY MEDICINE | Facility: CLINIC | Age: 48
End: 2023-02-14
Payer: COMMERCIAL

## 2023-02-14 ENCOUNTER — PATIENT MESSAGE (OUTPATIENT)
Dept: FAMILY MEDICINE | Facility: CLINIC | Age: 48
End: 2023-02-14
Payer: COMMERCIAL

## 2023-02-14 DIAGNOSIS — Z00.00 ROUTINE MEDICAL EXAM: ICD-10-CM

## 2023-02-14 DIAGNOSIS — R09.A2 GLOBUS SENSATION: Primary | ICD-10-CM

## 2023-02-14 DIAGNOSIS — R13.10 DYSPHAGIA, UNSPECIFIED TYPE: ICD-10-CM

## 2023-02-14 NOTE — TELEPHONE ENCOUNTER
Patient having some persistent globus sensation over the lower neck and thyroid area.  Some sensation of difficulty swallowing although no regurgitation and no food getting stuck but it does appear to be a persistent thing.  She did have some significant pain over the thyroid and suspected thyroiditis treated with anti-inflammatories and those inflammatory and pain symptoms resolved in a couple of days and this was probably back in April 2021.  No symptoms of thyroid dysfunction, no unexpected weight gain and so forth.      Will start with ultrasound, may need lab work

## 2023-02-18 ENCOUNTER — HOSPITAL ENCOUNTER (OUTPATIENT)
Dept: RADIOLOGY | Facility: OTHER | Age: 48
Discharge: HOME | End: 2023-02-18
Attending: INTERNAL MEDICINE
Payer: COMMERCIAL

## 2023-02-18 DIAGNOSIS — R09.A2 GLOBUS SENSATION: ICD-10-CM

## 2023-02-18 DIAGNOSIS — R13.10 DYSPHAGIA, UNSPECIFIED TYPE: ICD-10-CM

## 2023-02-18 PROCEDURE — 76536 US SOFT TISSUE HEAD NECK THYROID: ICD-10-PCS | Mod: 26,,, | Performed by: RADIOLOGY

## 2023-02-18 PROCEDURE — 76536 US EXAM OF HEAD AND NECK: CPT | Mod: TC

## 2023-02-18 PROCEDURE — 76536 US EXAM OF HEAD AND NECK: CPT | Mod: 26,,, | Performed by: RADIOLOGY

## 2023-02-20 ENCOUNTER — PATIENT MESSAGE (OUTPATIENT)
Dept: FAMILY MEDICINE | Facility: CLINIC | Age: 48
End: 2023-02-20
Payer: COMMERCIAL

## 2023-02-20 DIAGNOSIS — E04.1 THYROID NODULE: ICD-10-CM

## 2023-02-20 DIAGNOSIS — R09.A2 GLOBUS SENSATION: Primary | ICD-10-CM

## 2023-02-21 NOTE — TELEPHONE ENCOUNTER
Sent patient a message about her thyroid ultrasound.  She does have thyroid nodules that need to be followed and will refer to endocrinology to assess if the location of any of these nodules could correlate with her symptoms and also set up long-term follow-up.

## 2023-02-24 ENCOUNTER — HOSPITAL ENCOUNTER (OUTPATIENT)
Dept: RADIOLOGY | Facility: OTHER | Age: 48
Discharge: HOME | End: 2023-02-24
Attending: OBSTETRICS & GYNECOLOGY
Payer: COMMERCIAL

## 2023-02-24 DIAGNOSIS — N92.1 BREAKTHROUGH BLEEDING ON OCPS: ICD-10-CM

## 2023-02-24 PROCEDURE — 76830 TRANSVAGINAL US NON-OB: CPT | Mod: 26,,, | Performed by: RADIOLOGY

## 2023-02-24 PROCEDURE — 76856 US EXAM PELVIC COMPLETE: CPT | Mod: 26,,, | Performed by: RADIOLOGY

## 2023-02-24 PROCEDURE — 76856 US EXAM PELVIC COMPLETE: CPT | Mod: TC

## 2023-02-24 PROCEDURE — 76830 US PELVIS COMP WITH TRANSVAG NON-OB (XPD): ICD-10-PCS | Mod: 26,,, | Performed by: RADIOLOGY

## 2023-02-24 PROCEDURE — 76856 US PELVIS COMP WITH TRANSVAG NON-OB (XPD): ICD-10-PCS | Mod: 26,,, | Performed by: RADIOLOGY

## 2023-03-01 ENCOUNTER — OFFICE VISIT (OUTPATIENT)
Dept: ENDOCRINOLOGY | Facility: CLINIC | Age: 48
End: 2023-03-01
Payer: COMMERCIAL

## 2023-03-01 ENCOUNTER — LAB VISIT (OUTPATIENT)
Dept: LAB | Facility: HOSPITAL | Age: 48
End: 2023-03-01
Attending: INTERNAL MEDICINE
Payer: COMMERCIAL

## 2023-03-01 ENCOUNTER — TELEPHONE (OUTPATIENT)
Dept: ENDOCRINOLOGY | Facility: CLINIC | Age: 48
End: 2023-03-01
Payer: COMMERCIAL

## 2023-03-01 VITALS
BODY MASS INDEX: 22.26 KG/M2 | SYSTOLIC BLOOD PRESSURE: 102 MMHG | HEIGHT: 65 IN | DIASTOLIC BLOOD PRESSURE: 62 MMHG | HEART RATE: 72 BPM | OXYGEN SATURATION: 99 % | WEIGHT: 133.63 LBS

## 2023-03-01 DIAGNOSIS — N92.6 IRREGULAR MENSES: ICD-10-CM

## 2023-03-01 DIAGNOSIS — E04.1 THYROID NODULE: ICD-10-CM

## 2023-03-01 DIAGNOSIS — R09.A2 GLOBUS SENSATION: ICD-10-CM

## 2023-03-01 DIAGNOSIS — N83.209 CYST OF OVARY, UNSPECIFIED LATERALITY: Primary | ICD-10-CM

## 2023-03-01 LAB — TSH SERPL DL<=0.005 MIU/L-ACNC: 2.22 UIU/ML (ref 0.4–4)

## 2023-03-01 PROCEDURE — 1160F RVW MEDS BY RX/DR IN RCRD: CPT | Mod: CPTII,S$GLB,, | Performed by: INTERNAL MEDICINE

## 2023-03-01 PROCEDURE — 3078F DIAST BP <80 MM HG: CPT | Mod: CPTII,S$GLB,, | Performed by: INTERNAL MEDICINE

## 2023-03-01 PROCEDURE — 3074F SYST BP LT 130 MM HG: CPT | Mod: CPTII,S$GLB,, | Performed by: INTERNAL MEDICINE

## 2023-03-01 PROCEDURE — 3008F PR BODY MASS INDEX (BMI) DOCUMENTED: ICD-10-PCS | Mod: CPTII,S$GLB,, | Performed by: INTERNAL MEDICINE

## 2023-03-01 PROCEDURE — 99204 PR OFFICE/OUTPT VISIT, NEW, LEVL IV, 45-59 MIN: ICD-10-PCS | Mod: S$GLB,,, | Performed by: INTERNAL MEDICINE

## 2023-03-01 PROCEDURE — 3008F BODY MASS INDEX DOCD: CPT | Mod: CPTII,S$GLB,, | Performed by: INTERNAL MEDICINE

## 2023-03-01 PROCEDURE — 3078F PR MOST RECENT DIASTOLIC BLOOD PRESSURE < 80 MM HG: ICD-10-PCS | Mod: CPTII,S$GLB,, | Performed by: INTERNAL MEDICINE

## 2023-03-01 PROCEDURE — 99999 PR PBB SHADOW E&M-EST. PATIENT-LVL V: CPT | Mod: PBBFAC,,, | Performed by: INTERNAL MEDICINE

## 2023-03-01 PROCEDURE — 36415 COLL VENOUS BLD VENIPUNCTURE: CPT | Performed by: INTERNAL MEDICINE

## 2023-03-01 PROCEDURE — 99999 PR PBB SHADOW E&M-EST. PATIENT-LVL V: ICD-10-PCS | Mod: PBBFAC,,, | Performed by: INTERNAL MEDICINE

## 2023-03-01 PROCEDURE — 99204 OFFICE O/P NEW MOD 45 MIN: CPT | Mod: S$GLB,,, | Performed by: INTERNAL MEDICINE

## 2023-03-01 PROCEDURE — 84443 ASSAY THYROID STIM HORMONE: CPT | Performed by: INTERNAL MEDICINE

## 2023-03-01 PROCEDURE — 1159F PR MEDICATION LIST DOCUMENTED IN MEDICAL RECORD: ICD-10-PCS | Mod: CPTII,S$GLB,, | Performed by: INTERNAL MEDICINE

## 2023-03-01 PROCEDURE — 3074F PR MOST RECENT SYSTOLIC BLOOD PRESSURE < 130 MM HG: ICD-10-PCS | Mod: CPTII,S$GLB,, | Performed by: INTERNAL MEDICINE

## 2023-03-01 PROCEDURE — 1159F MED LIST DOCD IN RCRD: CPT | Mod: CPTII,S$GLB,, | Performed by: INTERNAL MEDICINE

## 2023-03-01 PROCEDURE — 1160F PR REVIEW ALL MEDS BY PRESCRIBER/CLIN PHARMACIST DOCUMENTED: ICD-10-PCS | Mod: CPTII,S$GLB,, | Performed by: INTERNAL MEDICINE

## 2023-03-01 NOTE — Clinical Note
Hi Dr. Villasenor, I saw Zara today and we reviewed her imaging. I don't think the nodules she has would cause her current symptoms particularly the food sticking. But one nodule does meet the KAMI biopsy criteria (ours are a little stricter than radiology criteria) so I got her set-up for that and am repeating her thyroid function to make sure normal. Its does sound like she had thyroiditis in 2021 but I would not expect that to have any lasting effects Thanks for sending her to me! Jacqueline

## 2023-03-01 NOTE — PROGRESS NOTES
Zara Leal is a 47 y.o. female referred by Dr. Piotr Villasenor for evaluation of thyroid nodules    History of Present Illness  Thyroid nodule(s)  Found on US done due to globus sensation and showed nodules in isthmus    For some time now she has noted feeling of food sticking or pills getting stuck when tries to swallow.     In past there has been question of thyroiditis with severe pain over gland although labs at that time with normal thyroid function  Treated symptomatically with NSAIDs and pain present for about 2 weeks and then resolved  No subsequent thyroid function testing    Has not been on thyroid hormone in the past    Thyroid US 2/2023:  Right thyroid lobe measures 4.8 x 0.8 x 1.3 cm.  Measured nodules as follows: (1) subcentimeter cystic appearing nodule measuring up to 0.6 cm.     Left thyroid lobe measures 5.0 x 0.8 x 1.2 cm.     Isthmus measures 0.2 cm.  Measured nodules as follows: (2) mixed heterogeneous nodule measuring 0.9 x 0.5 x 0.9 cm; (3) hypoechoic solid-appearing nodule measuring up to 1.1 cm.     No evidence for cervical adenopathy.     Impression:   Thyroid containing nodules, none which meet criteria for FNA guided sampling.  Isthmus nodule (above labeled 3) which meets criteria for subsequent follow-up.    Risk Factors for Thyroid Cancer:  Hx of External Beam Radiation:denies  Family Hx of Thyroid Cancer:denies    Denies rapid neck enlargement, SOB, or hoarseness.         Current Outpatient Medications:     norethindrone-ethinyl estradiol (BLISOVI FE 1/20, 28,) 1 mg-20 mcg (21)/75 mg (7) per tablet, Take 1 tablet by mouth once daily., Disp: 28 tablet, Rfl: 11    acyclovir (ZOVIRAX) 400 MG tablet, 1 po TID  For 3-5 days prn fever blisters (Patient not taking: Reported on 3/1/2023), Disp: 30 tablet, Rfl: 12    azithromycin (ZITHROMAX Z-J LUIS) 250 MG tablet, 2 pills on day 1, then 1 pill a day (Patient not taking: Reported on 3/1/2023), Disp: 6 tablet, Rfl: 0     HYDROcodone-acetaminophen (NORCO) 5-325 mg per tablet, Take 1 tablet by mouth every 6 (six) hours as needed for Pain. (Patient not taking: Reported on 3/1/2023), Disp: 7 tablet, Rfl: 0    ibuprofen (ADVIL ORAL), Take by mouth as needed., Disp: , Rfl:     ibuprofen (ADVIL,MOTRIN) 600 MG tablet, Take 1 tablet (600 mg total) by mouth 3 (three) times daily. (Patient not taking: Reported on 3/1/2023), Disp: 30 tablet, Rfl: 0    mupirocin (BACTROBAN) 2 % ointment, Apply topically 2 (two) times daily. (Patient not taking: Reported on 3/1/2023), Disp: 15 g, Rfl: 0    ROS as above    Objective:     Vitals:    03/01/23 1131   BP: 102/62   Pulse: 72     Wt Readings from Last 3 Encounters:   03/01/23 60.6 kg (133 lb 9.6 oz)   01/18/23 60.3 kg (133 lb)   11/24/21 60.1 kg (132 lb 6.4 oz)     Body mass index is 22.23 kg/m².  Physical Exam  Constitutional:       Appearance: She is well-developed.   HENT:      Head: Normocephalic.   Eyes:      Conjunctiva/sclera: Conjunctivae normal.   Neck:      Comments: Isthmus nodule  Pulmonary:      Effort: Pulmonary effort is normal.   Musculoskeletal:         General: Normal range of motion.   Skin:     General: Skin is warm.      Findings: No rash.   Neurological:      Mental Status: She is alert and oriented to person, place, and time.       Labs    Chemistry        Component Value Date/Time     03/26/2021 1410    K 4.1 03/26/2021 1410     03/26/2021 1410    CO2 26 03/26/2021 1410    BUN 9 03/26/2021 1410    CREATININE 0.8 03/26/2021 1410    GLU 86 03/26/2021 1410        Component Value Date/Time    CALCIUM 8.8 03/26/2021 1410    ALKPHOS 55 03/26/2021 1410    AST 16 03/26/2021 1410    ALT 14 03/26/2021 1410    BILITOT 0.4 03/26/2021 1410    ESTGFRAFRICA >60.0 03/26/2021 1410    EGFRNONAA >60.0 03/26/2021 1410              Assessment and Plan     Thyroid nodule  Reviewed ultrasound findings which show hypoechoic nodule just over centimeter in isthmus. This technically meets KAMI  FNA criteria. Reviewed procedure and will proceed  Update TSH today although clinically euthyroid  Based on size and location of nodule I do not think this explains her recent symptoms particularly dysphagia. Recommend further evaluation of other causes        RTC 1 year with repeat US or sooner pending results of above        Jacqueline English MD

## 2023-03-01 NOTE — ASSESSMENT & PLAN NOTE
Reviewed ultrasound findings which show hypoechoic nodule just over centimeter in isthmus. This technically meets KAMI FNA criteria. Reviewed procedure and will proceed  Update TSH today although clinically euthyroid  Based on size and location of nodule I do not think this explains her recent symptoms particularly dysphagia. Recommend further evaluation of other causes

## 2023-03-01 NOTE — TELEPHONE ENCOUNTER
----- Message from Patricia Quezada sent at 3/1/2023  8:58 AM CST -----  Contact: pt  Pt requesting call back RE: would like to know if she can come in earlier than her 2pm appt time. She states anything after 10 would be good, please call    Confirmed contact below:  Contact Name:Zara Mel  Phone Number: [phone]

## 2023-03-02 RX ORDER — MISOPROSTOL 200 UG/1
TABLET ORAL
Qty: 2 TABLET | Refills: 0 | Status: ON HOLD | OUTPATIENT
Start: 2023-03-02 | End: 2023-06-02 | Stop reason: HOSPADM

## 2023-03-06 ENCOUNTER — HOSPITAL ENCOUNTER (OUTPATIENT)
Dept: RADIOLOGY | Facility: OTHER | Age: 48
Discharge: HOME | End: 2023-03-06
Attending: OBSTETRICS & GYNECOLOGY
Payer: COMMERCIAL

## 2023-03-06 DIAGNOSIS — Z12.31 ENCOUNTER FOR SCREENING MAMMOGRAM FOR MALIGNANT NEOPLASM OF BREAST: ICD-10-CM

## 2023-03-06 PROCEDURE — 77067 MAMMO DIGITAL SCREENING BILAT WITH TOMO: ICD-10-PCS | Mod: 26,,, | Performed by: RADIOLOGY

## 2023-03-06 PROCEDURE — 77063 MAMMO DIGITAL SCREENING BILAT WITH TOMO: ICD-10-PCS | Mod: 26,,, | Performed by: RADIOLOGY

## 2023-03-06 PROCEDURE — 77063 BREAST TOMOSYNTHESIS BI: CPT | Mod: 26,,, | Performed by: RADIOLOGY

## 2023-03-06 PROCEDURE — 77067 SCR MAMMO BI INCL CAD: CPT | Mod: 26,,, | Performed by: RADIOLOGY

## 2023-03-06 PROCEDURE — 77067 SCR MAMMO BI INCL CAD: CPT | Mod: TC

## 2023-03-15 ENCOUNTER — PROCEDURE VISIT (OUTPATIENT)
Dept: OBSTETRICS AND GYNECOLOGY | Facility: CLINIC | Age: 48
End: 2023-03-15
Attending: OBSTETRICS & GYNECOLOGY
Payer: COMMERCIAL

## 2023-03-15 VITALS
WEIGHT: 131 LBS | DIASTOLIC BLOOD PRESSURE: 66 MMHG | HEIGHT: 65 IN | SYSTOLIC BLOOD PRESSURE: 95 MMHG | HEART RATE: 70 BPM | BODY MASS INDEX: 21.83 KG/M2

## 2023-03-15 DIAGNOSIS — N92.1 MENORRHAGIA WITH IRREGULAR CYCLE: Primary | ICD-10-CM

## 2023-03-15 LAB
B-HCG UR QL: NEGATIVE
CTP QC/QA: YES

## 2023-03-15 PROCEDURE — 81025 URINE PREGNANCY TEST: CPT | Mod: S$GLB,,, | Performed by: OBSTETRICS & GYNECOLOGY

## 2023-03-15 PROCEDURE — 58100 PR BIOPSY OF UTERUS LINING: ICD-10-PCS | Mod: S$GLB,,, | Performed by: OBSTETRICS & GYNECOLOGY

## 2023-03-15 PROCEDURE — 88305 TISSUE EXAM BY PATHOLOGIST: CPT | Mod: 26,,, | Performed by: PATHOLOGY

## 2023-03-15 PROCEDURE — 81025 POCT URINE PREGNANCY: ICD-10-PCS | Mod: S$GLB,,, | Performed by: OBSTETRICS & GYNECOLOGY

## 2023-03-15 PROCEDURE — 88305 TISSUE EXAM BY PATHOLOGIST: CPT | Performed by: PATHOLOGY

## 2023-03-15 PROCEDURE — 88305 TISSUE EXAM BY PATHOLOGIST: ICD-10-PCS | Mod: 26,,, | Performed by: PATHOLOGY

## 2023-03-15 PROCEDURE — 58100 BIOPSY OF UTERUS LINING: CPT | Mod: S$GLB,,, | Performed by: OBSTETRICS & GYNECOLOGY

## 2023-03-15 NOTE — PROCEDURES
Endometrial Biopsy- Today    Date/Time: 3/15/2023 1:30 PM  Performed by: Maryana Chamberlain MD  Authorized by: Maryana Chamberlain MD     Consent:     Consent obtained:  Written    Consent given by:  Patient    Patient questions answered: yes      Patient agrees, verbalizes understanding, and wants to proceed: yes      Educational handouts given: yes      Instructions and paperwork completed: yes    Indication:     Indications: Menorrhagia    Pre-procedure:     Pre-procedure timeout performed: yes    Procedure:     Procedure: endometrial biopsy with Pipelle      Cervix cleaned and prepped: yes      A paracervical block was performed: no      An intracervical block was performed: no      The cervix was dilated: no      Uterus sounded: yes      Uterus sound depth (cm):  8    Specimen collected: specimen collected and sent to pathology      Patient tolerated procedure well with no complications: yes    Comments:     Procedure comments:  Patient reports heavy and irregular cycles. She also has follow up ultrasound for ovarian cyst.

## 2023-03-18 ENCOUNTER — TELEPHONE (OUTPATIENT)
Dept: FAMILY MEDICINE | Facility: CLINIC | Age: 48
End: 2023-03-18
Payer: COMMERCIAL

## 2023-03-18 DIAGNOSIS — R13.10 DYSPHAGIA, UNSPECIFIED TYPE: Primary | ICD-10-CM

## 2023-03-18 DIAGNOSIS — R09.A2 GLOBUS SENSATION: ICD-10-CM

## 2023-03-19 NOTE — TELEPHONE ENCOUNTER
Still w upper dysphagia and thyroid NOT the cause but still will get Bx. Prob will need EGD and may need motility eval, r/o eosino esophagitis etc

## 2023-03-21 LAB
FINAL PATHOLOGIC DIAGNOSIS: NORMAL
GROSS: NORMAL
Lab: NORMAL

## 2023-03-23 ENCOUNTER — CLINICAL SUPPORT (OUTPATIENT)
Dept: ENDOSCOPY | Facility: HOSPITAL | Age: 48
End: 2023-03-23
Attending: INTERNAL MEDICINE
Payer: COMMERCIAL

## 2023-03-23 ENCOUNTER — PATIENT MESSAGE (OUTPATIENT)
Dept: FAMILY MEDICINE | Facility: CLINIC | Age: 48
End: 2023-03-23
Payer: COMMERCIAL

## 2023-03-23 DIAGNOSIS — Z12.11 COLON CANCER SCREENING: ICD-10-CM

## 2023-03-23 RX ORDER — SODIUM, POTASSIUM,MAG SULFATES 17.5-3.13G
1 SOLUTION, RECONSTITUTED, ORAL ORAL DAILY
Qty: 1 KIT | Refills: 0 | Status: SHIPPED | OUTPATIENT
Start: 2023-03-23 | End: 2023-03-25

## 2023-04-08 ENCOUNTER — HOSPITAL ENCOUNTER (OUTPATIENT)
Dept: RADIOLOGY | Facility: OTHER | Age: 48
Discharge: HOME | End: 2023-04-08
Attending: OBSTETRICS & GYNECOLOGY
Payer: COMMERCIAL

## 2023-04-08 DIAGNOSIS — N83.209 CYST OF OVARY, UNSPECIFIED LATERALITY: ICD-10-CM

## 2023-04-08 PROCEDURE — 76856 US EXAM PELVIC COMPLETE: CPT | Mod: TC

## 2023-04-08 PROCEDURE — 76856 US EXAM PELVIC COMPLETE: CPT | Mod: 26,,, | Performed by: RADIOLOGY

## 2023-04-08 PROCEDURE — 76830 US PELVIS COMP WITH TRANSVAG NON-OB (XPD): ICD-10-PCS | Mod: 26,,, | Performed by: RADIOLOGY

## 2023-04-08 PROCEDURE — 76830 TRANSVAGINAL US NON-OB: CPT | Mod: 26,,, | Performed by: RADIOLOGY

## 2023-04-08 PROCEDURE — 76856 US PELVIS COMP WITH TRANSVAG NON-OB (XPD): ICD-10-PCS | Mod: 26,,, | Performed by: RADIOLOGY

## 2023-04-19 RX ORDER — ACYCLOVIR 400 MG/1
TABLET ORAL
Qty: 30 TABLET | Refills: 12 | Status: ON HOLD | OUTPATIENT
Start: 2023-04-19 | End: 2023-06-02 | Stop reason: HOSPADM

## 2023-04-27 ENCOUNTER — HOSPITAL ENCOUNTER (OUTPATIENT)
Dept: ENDOCRINOLOGY | Facility: CLINIC | Age: 48
Discharge: HOME | End: 2023-04-27
Attending: INTERNAL MEDICINE
Payer: COMMERCIAL

## 2023-04-27 DIAGNOSIS — E04.1 THYROID NODULE: ICD-10-CM

## 2023-04-27 PROCEDURE — 10005 FNA BX W/US GDN 1ST LES: CPT | Mod: S$GLB,,, | Performed by: INTERNAL MEDICINE

## 2023-04-27 PROCEDURE — 10005 US FINE NEEDLE ASPIRATION BIOPSY, FIRST LESION: ICD-10-PCS | Mod: S$GLB,,, | Performed by: INTERNAL MEDICINE

## 2023-04-27 PROCEDURE — 88173 PR  INTERPRETATION OF FNA SMEAR: ICD-10-PCS | Mod: 26,,, | Performed by: PATHOLOGY

## 2023-04-27 PROCEDURE — 88173 CYTOPATH EVAL FNA REPORT: CPT | Performed by: PATHOLOGY

## 2023-04-27 PROCEDURE — 88173 CYTOPATH EVAL FNA REPORT: CPT | Mod: 26,,, | Performed by: PATHOLOGY

## 2023-05-01 ENCOUNTER — TELEPHONE (OUTPATIENT)
Dept: ENDOSCOPY | Facility: HOSPITAL | Age: 48
End: 2023-05-01
Payer: COMMERCIAL

## 2023-05-01 ENCOUNTER — TELEPHONE (OUTPATIENT)
Dept: ENDOCRINOLOGY | Facility: CLINIC | Age: 48
End: 2023-05-01
Payer: COMMERCIAL

## 2023-05-01 ENCOUNTER — PATIENT MESSAGE (OUTPATIENT)
Dept: ENDOCRINOLOGY | Facility: CLINIC | Age: 48
End: 2023-05-01
Payer: COMMERCIAL

## 2023-05-01 DIAGNOSIS — E04.1 THYROID NODULE: Primary | ICD-10-CM

## 2023-05-01 LAB
FINAL PATHOLOGIC DIAGNOSIS: ABNORMAL
Lab: ABNORMAL

## 2023-05-01 NOTE — TELEPHONE ENCOUNTER
FNA returned with PTC  Called pt to discuss result and next steps  Will refer to Dr. Cohen for surgery.  Based on size of nodule lobectomy is reasonable  Will also obtain lymph node mapping

## 2023-05-01 NOTE — TELEPHONE ENCOUNTER
----- Message from Miranda Borden RN sent at 5/1/2023  9:52 AM CDT -----  Regarding: FW: Procedure Questions (Wednesday)5.3.23  Contact: @[phone]    ----- Message -----  From: Rupal Mccloud  Sent: 5/1/2023   9:50 AM CDT  To: Sammy Josue Staff  Subject: Procedure Questions (Wednesday)5.3.23            Patient is calling about upcoming procedure 5.3.23 and her medications (what to take/ when to start/stop) and specific time frame, when do the procedures start and end.    [phone]

## 2023-05-02 ENCOUNTER — TELEPHONE (OUTPATIENT)
Dept: SURGERY | Facility: CLINIC | Age: 48
End: 2023-05-02
Payer: COMMERCIAL

## 2023-05-02 ENCOUNTER — HOSPITAL ENCOUNTER (OUTPATIENT)
Dept: RADIOLOGY | Facility: HOSPITAL | Age: 48
Discharge: HOME | End: 2023-05-02
Attending: INTERNAL MEDICINE
Payer: COMMERCIAL

## 2023-05-02 DIAGNOSIS — E04.1 THYROID NODULE: Primary | ICD-10-CM

## 2023-05-02 DIAGNOSIS — E04.1 THYROID NODULE: ICD-10-CM

## 2023-05-02 PROCEDURE — 76536 US SOFT TISSUE HEAD NECK THYROID: ICD-10-PCS | Mod: 26,,, | Performed by: RADIOLOGY

## 2023-05-02 PROCEDURE — 76536 US EXAM OF HEAD AND NECK: CPT | Mod: TC

## 2023-05-02 PROCEDURE — 76536 US EXAM OF HEAD AND NECK: CPT | Mod: 26,,, | Performed by: RADIOLOGY

## 2023-05-02 NOTE — PROGRESS NOTES
Endocrine Surgery History & Physical     REFERRING PROVIDER: Jacqueline English MD    REASON FOR VISIT: Thyroid cancer, right 1.1cm thyroid nodule within the isthmus with cytology diagnostic of papillary thyroid cancer    HPI: Zara Leal is a 47 y.o. female patient with a history notable for palpitations and PVCs who presents in consultation for management of a newly diagnosed 1.1 cm papillary thyroid cancer in a right isthmus nodule. She reports a recent ultrasound in Feb 2023 for globus sensation and dysphagia. She was seen in by Dr. English in 3/1/23 for this right isthmus nodule that met FNA criteria.  Cytology was malignant, diagnostic of papillary thyroid carcinoma.     Details of the workup are as follows:     Recent laboratory studies:  TSH: 2.219  Vitamin D: 35 in 2021    Thyroid and Neck Imaging:  Most recent thyroid ultrasound: 02/18/2023  Dominant thyroid nodules: cystic sub-centimeter right inferior thyroid nodule, 1.1cm hypoechoic and solid right isthmus nodule, sub-centimeter isthmus nodule  Lymph node mapping: negative for pathologic lymphadenopathy (5/2/23)    Cytology:  FNA biopsy of isthmus nodule diagnostic for papillary thyroid carcinoma  Cytology: Boss VI    Medications:  Vitamin D supplementation: none  Lithium, biotin, amiodarone, iodinated contrast: none  Calcium supplements or calcimetrics: multivitamin    Symptoms:   Thyroid symptoms: \denies overt hyperthyroid or hypothyroid symptoms, she does have palpitations  Parathyroid symptoms: no nephrolithiasis, muscle/joint pain, abdominal pain/cramping, brain fog, concentration difficulties  Compressive symptoms: has dysphagia and has a GI visit pending    Voice symptoms: no hoarseness, voice changes or increased need to clear the throat.    Surgical risk factors:  Risk of concurrent parathyroid disease low, calcium levels normal  History of neck radiation: none  Prior neck surgery: none  Cardiovascular risk: prior echocardiogram  around 2014 with LVEF of 55-60%, PA pressure of 25 mmHg, normal LV diastolic function  Antiplatelet therapy and anticoagulation: none    Bone Health:  DEXA scan not performed    Family history:  No family history of endocrinopathies or endocrine cancers including thyroid diease, thyroid cancer, parathyroid disease, hypercalcemia.      LABORATORY STUDIES:  I personally and independently reviewed relevant lab test results, including the following:    TSH   Date Value Ref Range Status   03/01/2023 2.219 0.400 - 4.000 uIU/mL Final   04/19/2021 1.630 0.400 - 4.000 uIU/mL Final   03/26/2021 2.794 0.400 - 4.000 uIU/mL Final     Free T4   Date Value Ref Range Status   04/19/2021 0.91 0.71 - 1.51 ng/dL Final   08/15/2019 0.90 0.71 - 1.51 ng/dL Final   06/17/2014 0.99 0.71 - 1.51 ng/dL Final     Vit D, 25-Hydroxy   Date Value Ref Range Status   03/26/2021 35 30 - 96 ng/mL Final     Comment:     Vitamin D deficiency.........<10 ng/mL                              Vitamin D insufficiency......10-29 ng/mL       Vitamin D sufficiency........> or equal to 30 ng/mL  Vitamin D toxicity............>100 ng/mL     08/15/2019 37 30 - 96 ng/mL Final     Comment:     Vitamin D deficiency.........<10 ng/mL                              Vitamin D insufficiency......10-29 ng/mL       Vitamin D sufficiency........> or equal to 30 ng/mL  Vitamin D toxicity............>100 ng/mL       Calcium   Date Value Ref Range Status   03/26/2021 8.8 8.7 - 10.5 mg/dL Final   09/04/2019 9.1 8.7 - 10.5 mg/dL Final   08/15/2019 9.0 8.7 - 10.5 mg/dL Final     Magnesium   Date Value Ref Range Status   06/17/2014 2.2 1.6 - 2.6 mg/dL Final     Albumin   Date Value Ref Range Status   03/26/2021 4.0 3.5 - 5.2 g/dL Final   09/04/2019 4.1 3.5 - 5.2 g/dL Final   08/15/2019 4.3 3.5 - 5.2 g/dL Final        PAST MEDICAL HISTORY:  Patient Active Problem List   Diagnosis    Palpitations    Premature ventricular contractions (PVCs) (VPCs)    Weakness    Posture imbalance     Chronic pelvic pain in female    Status post hysteroscopy with polypectomy and D&C     Thyroid nodule    Globus sensation        PAST SURGICAL HISTORY:  Past Surgical History:   Procedure Laterality Date    HYSTEROSCOPY WITH DILATION AND CURETTAGE OF UTERUS N/A 8/28/2019    Procedure: HYSTEROSCOPY, WITH DILATION AND CURETTAGE OF UTERUS;  Surgeon: Maryana Chamberlain MD;  Location: [facility];  Service: OB/GYN;  Laterality: N/A;    TONSILLECTOMY      uterus septate  12/2005    WISDOM TOOTH EXTRACTION          MEDICATIONS:  Current Outpatient Medications   Medication Sig Dispense Refill    acyclovir (ZOVIRAX) 400 MG tablet 1 po TID  For 3-5 days prn fever blisters (Patient not taking: Reported on 5/4/2023) 30 tablet 12    azithromycin (ZITHROMAX Z-J LUIS) 250 MG tablet 2 pills on day 1, then 1 pill a day (Patient not taking: Reported on 5/4/2023) 6 tablet 0    HYDROcodone-acetaminophen (NORCO) 5-325 mg per tablet Take 1 tablet by mouth every 6 (six) hours as needed for Pain. (Patient not taking: Reported on 5/4/2023) 7 tablet 0    ibuprofen (ADVIL ORAL) Take by mouth as needed.      ibuprofen (ADVIL,MOTRIN) 600 MG tablet Take 1 tablet (600 mg total) by mouth 3 (three) times daily. (Patient not taking: Reported on 5/4/2023) 30 tablet 0    miSOPROStoL (CYTOTEC) 200 MCG Tab Take 1 pill po at 7am and 1 pill po at 11am the morning of endometrial biopsy (Patient not taking: Reported on 5/4/2023) 2 tablet 0    mupirocin (BACTROBAN) 2 % ointment Apply topically 2 (two) times daily. (Patient not taking: Reported on 5/4/2023) 15 g 0    norethindrone-ethinyl estradiol (BLISOVI FE 1/20, 28,) 1 mg-20 mcg (21)/75 mg (7) per tablet Take 1 tablet by mouth once daily. (Patient not taking: Reported on 5/4/2023) 28 tablet 11     No current facility-administered medications for this visit.       ALLERGIES:  Review of patient's allergies indicates:  No Known Allergies    SOCIAL HISTORY:  Social History     Socioeconomic History    Marital  "status:    Tobacco Use    Smoking status: Never     Passive exposure: Never    Smokeless tobacco: Never   Substance and Sexual Activity    Alcohol use: Not Currently    Drug use: No    Sexual activity: Yes     Partners: Male     Birth control/protection: Condom   Other Topics Concern    Are you pregnant or think you may be? No    Breast-feeding No         FAMILY HISTORY:  Family History   Problem Relation Age of Onset    Melanoma Paternal Grandfather     Breast cancer Paternal Grandmother 80    Melanoma Paternal Grandmother     Melanoma Father     Melanoma Sister     Ovarian cancer Neg Hx     Colon cancer Neg Hx     Cancer Neg Hx          REVIEW OF SYSTEMS:  A detailed review of systems was reviewed with the patient, pertinent positives and negatives are presented in the note and is otherwise negative.    PHYSICAL EXAMINATION:  Vital Signs: BP (!) 90/51 (BP Location: Left arm, Patient Position: Sitting, BP Method: Medium (Automatic))   Pulse 75   Temp 98.6 °F (37 °C)   Ht 5' 5" (1.651 m)   Wt 56.7 kg (124 lb 14.3 oz)   LMP 04/16/2023   SpO2 97%   BMI 20.78 kg/m²     Constitutional: no acute distress, comfortable, well appearing  HENT: no lid lag, no exophthalmos, no scleral icterus, moist mucous membranes, good dentition  Neck: supple, trachea in midline, thyroid is soft and moves well with swallowing, there is a palpable right isthmus nodule, good neck extension  Heme/Lymph: no cervical or supraclavicular lymphadenopathy  Respiratory: normal respiratory effort, no wheezes or stridor  Cardiovascular: regular rate and rhythm  Extremities: no edema  Skin: warm and dry, no rashes  Neurologic: no gross resting tremor of outstretched hands, voice adequate  Vascular: radial pulses palpable bilaterally  Psychiatric: affect normal    IMAGING STUDIES:  I personally and independently reviewed, visualized and interpreted the images of the below listed radiology studies (including the neck ultrasound and lymph " node mapping) and my findings are notable for a right isthmus nodule with indistinct borders along the strap muscle and what appears to be an adjacent or contiguous nodule in the isthmus.  The initial thyroid ultrasound in 2/2023 did report and show an abnormal lymph node but the formal lymph node mapping did not re-demonstrate this lymph node in the left neck.  Reports below for reference.    US Soft Tissue Head Neck Thyroid 02/18/2023    Narrative  EXAMINATION:  US SOFT TISSUE HEAD NECK THYROID    CLINICAL HISTORY:  Other specified symptoms and signs involving the circulatory and respiratory systems    TECHNIQUE:  Ultrasound of the thyroid.    COMPARISON:  None.    FINDINGS:  Right thyroid lobe measures 4.8 x 0.8 x 1.3 cm.  Measured nodules as follows: (1) subcentimeter cystic appearing nodule measuring up to 0.6 cm.    Left thyroid lobe measures 5.0 x 0.8 x 1.2 cm.    Isthmus measures 0.2 cm.  Measured nodules as follows: (2) mixed heterogeneous nodule measuring 0.9 x 0.5 x 0.9 cm; (3) hypoechoic solid-appearing nodule measuring up to 1.1 cm.    No evidence for cervical adenopathy.    Impression  Thyroid containing nodules, none which meet criteria for FNA guided sampling.  Isthmus nodule (above labeled 3) which meets criteria for subsequent follow-up.      Electronically signed by: Landry Zamudio  Date:    02/19/2023  Time:    08:37    US Soft Tissue Head Neck Thyroid 05/02/2023    Narrative  EXAMINATION:  US SOFT TISSUE HEAD NECK THYROID    CLINICAL HISTORY:  lymph node mapping, papillary thyroid cancer; Nontoxic single thyroid nodule    TECHNIQUE:  Ultrasound of the cervical lymph nodes was performed.    COMPARISON:  Ultrasound 02/18/2023    FINDINGS:  Right zone 2 node measuring 2.3 x 0.4 x 1.3 cm.    Left zone 2 node measures 2.0 x 0.5 x 1.0 cm.    2 visualized nodes within the left zone 3 measuring 1.0 x 0.3 x 0.7 and 1.2 x 0.2 x 0.5 cm.    All visualized nodes demonstrate normal fatty hilum and normal  cortical thickness.    Mixed cystic and solid nodule within the right lower thyroid measuring 0.6 cm.    Impression  Multiple cervical lymph nodes as above which demonstrate normal morphology    Mixed cystic and solid nodule within the right lower thyroid.  In accordance with ACR guidelines, no follow-up imaging or FNA is recommended.    Electronically signed by resident: Prudencio Tillman  Date:    05/02/2023  Time:    10:59    Electronically signed by: Tito Olivas MD  Date:    05/02/2023  Time:    11:06      CYTOLOGY:  Final Pathologic Diagnosis   Date Value Ref Range Status   04/27/2023 (A)  Final    THYROID, ISTHMUS, FNA:  [facility] [facility] Thyroid Cytology Category: Malignant  Papillary thyroid carcinoma.     Other findings and comments:  Blood present.       Comment:     Interp By Elsa Bustos MD, Signed on 05/01/2023 at 12:51         IMPRESSION:  I had the pleasure of seeing Ms. Leal in endocrine surgical consultation regarding her right isthmus nodule with malignant cytology.  I have discussed with Ms. Leal at length regarding the current surgical and non-surgical treatment options.  Based on the current clinical findings and thorough review of the risks, benefits and alternatives, the patient elects to proceed with a right thyroid lobectomy.    I discussed with the patient the expected perioperative course. A thyroid lobectomy can be performed as an outpatient, if the patient tolerates it well.  The possible complications associated with this may include, but may not be restricted to hoarseness, recurrent laryngeal nerve or superior laryngeal nerve injury - temporary or permanent, neck hematoma, infection, scarring, death and imponderables.  Hypocalcemia would be a risk if the patient would need a total or completion thyroidectomy.  The patient understood that thyroid hormone replacement may be necessary even after a thyroid lobectomy, and definitely after a total or completion  thyroidectomy. Thyroid function will need to be monitored.     All questions were answered and the patient expressed understanding of all the risks, benefits and alternatives, and agreed to proceed with the plan despite the risks.  Surgical consent was signed and witnessed.    Problem List Items Addressed This Visit          Cardiac/Vascular    Palpitations     Euthyroid.  Will check TSH post op to evaluate the need for thyroid hormone replacement.               Endocrine    Thyroid nodule - Primary     1.1 cm right isthmus nodule with malignant cytology, lymph node mapping negative.  Discussed options for thyroid lobectomy versus upfront total thyroidectomy with associated risks, benefits and alternatives.  Based on a thorough discussion, will proceed to the OR for right thyroid lobectomy and isthmusectomy.           Relevant Orders    CBC Auto Differential    Comprehensive Metabolic Panel    Case Request Operating Room: RIGHT LOBECTOMY, THYROID WITH UNILATERAL NIM NERVE MONITORING (Completed)       Other    Globus sensation     Unlikely related to the isthmus nodule, discussed this sensation may not improve postoperatively and in fact could become worse, at least initially after surgery.  If symptoms persist, have offered to coordinate GI follow up.             Jerica Cohen MD  [facility] Surgeon  Endocrine Surgery  5/4/23

## 2023-05-04 ENCOUNTER — DOCUMENTATION ONLY (OUTPATIENT)
Dept: SURGERY | Facility: CLINIC | Age: 48
End: 2023-05-04
Payer: COMMERCIAL

## 2023-05-04 ENCOUNTER — HOSPITAL ENCOUNTER (OUTPATIENT)
Dept: CARDIOLOGY | Facility: CLINIC | Age: 48
Discharge: HOME | End: 2023-05-04
Payer: COMMERCIAL

## 2023-05-04 ENCOUNTER — OFFICE VISIT (OUTPATIENT)
Dept: SURGERY | Facility: CLINIC | Age: 48
End: 2023-05-04
Payer: COMMERCIAL

## 2023-05-04 VITALS
TEMPERATURE: 99 F | HEIGHT: 65 IN | SYSTOLIC BLOOD PRESSURE: 90 MMHG | BODY MASS INDEX: 20.8 KG/M2 | DIASTOLIC BLOOD PRESSURE: 51 MMHG | HEART RATE: 75 BPM | WEIGHT: 124.88 LBS | OXYGEN SATURATION: 97 %

## 2023-05-04 DIAGNOSIS — E04.1 THYROID NODULE: ICD-10-CM

## 2023-05-04 DIAGNOSIS — R09.A2 GLOBUS SENSATION: ICD-10-CM

## 2023-05-04 DIAGNOSIS — E04.1 THYROID NODULE: Primary | ICD-10-CM

## 2023-05-04 DIAGNOSIS — R00.2 PALPITATIONS: ICD-10-CM

## 2023-05-04 PROCEDURE — 99999 PR PBB SHADOW E&M-EST. PATIENT-LVL V: CPT | Mod: PBBFAC,,, | Performed by: STUDENT IN AN ORGANIZED HEALTH CARE EDUCATION/TRAINING PROGRAM

## 2023-05-04 PROCEDURE — 99999 PR PBB SHADOW E&M-EST. PATIENT-LVL V: ICD-10-PCS | Mod: PBBFAC,,, | Performed by: STUDENT IN AN ORGANIZED HEALTH CARE EDUCATION/TRAINING PROGRAM

## 2023-05-04 PROCEDURE — 3078F PR MOST RECENT DIASTOLIC BLOOD PRESSURE < 80 MM HG: ICD-10-PCS | Mod: CPTII,S$GLB,, | Performed by: STUDENT IN AN ORGANIZED HEALTH CARE EDUCATION/TRAINING PROGRAM

## 2023-05-04 PROCEDURE — 99205 PR OFFICE/OUTPT VISIT, NEW, LEVL V, 60-74 MIN: ICD-10-PCS | Mod: S$GLB,,, | Performed by: STUDENT IN AN ORGANIZED HEALTH CARE EDUCATION/TRAINING PROGRAM

## 2023-05-04 PROCEDURE — 93005 EKG 12-LEAD: ICD-10-PCS | Mod: S$GLB,,, | Performed by: STUDENT IN AN ORGANIZED HEALTH CARE EDUCATION/TRAINING PROGRAM

## 2023-05-04 PROCEDURE — 93010 ELECTROCARDIOGRAM REPORT: CPT | Mod: S$GLB,,, | Performed by: INTERNAL MEDICINE

## 2023-05-04 PROCEDURE — 1159F PR MEDICATION LIST DOCUMENTED IN MEDICAL RECORD: ICD-10-PCS | Mod: CPTII,S$GLB,, | Performed by: STUDENT IN AN ORGANIZED HEALTH CARE EDUCATION/TRAINING PROGRAM

## 2023-05-04 PROCEDURE — 3074F SYST BP LT 130 MM HG: CPT | Mod: CPTII,S$GLB,, | Performed by: STUDENT IN AN ORGANIZED HEALTH CARE EDUCATION/TRAINING PROGRAM

## 2023-05-04 PROCEDURE — 99205 OFFICE O/P NEW HI 60 MIN: CPT | Mod: S$GLB,,, | Performed by: STUDENT IN AN ORGANIZED HEALTH CARE EDUCATION/TRAINING PROGRAM

## 2023-05-04 PROCEDURE — 93010 EKG 12-LEAD: ICD-10-PCS | Mod: S$GLB,,, | Performed by: INTERNAL MEDICINE

## 2023-05-04 PROCEDURE — 3008F BODY MASS INDEX DOCD: CPT | Mod: CPTII,S$GLB,, | Performed by: STUDENT IN AN ORGANIZED HEALTH CARE EDUCATION/TRAINING PROGRAM

## 2023-05-04 PROCEDURE — 3008F PR BODY MASS INDEX (BMI) DOCUMENTED: ICD-10-PCS | Mod: CPTII,S$GLB,, | Performed by: STUDENT IN AN ORGANIZED HEALTH CARE EDUCATION/TRAINING PROGRAM

## 2023-05-04 PROCEDURE — 1159F MED LIST DOCD IN RCRD: CPT | Mod: CPTII,S$GLB,, | Performed by: STUDENT IN AN ORGANIZED HEALTH CARE EDUCATION/TRAINING PROGRAM

## 2023-05-04 PROCEDURE — 3078F DIAST BP <80 MM HG: CPT | Mod: CPTII,S$GLB,, | Performed by: STUDENT IN AN ORGANIZED HEALTH CARE EDUCATION/TRAINING PROGRAM

## 2023-05-04 PROCEDURE — 93005 ELECTROCARDIOGRAM TRACING: CPT | Mod: S$GLB,,, | Performed by: STUDENT IN AN ORGANIZED HEALTH CARE EDUCATION/TRAINING PROGRAM

## 2023-05-04 PROCEDURE — 3074F PR MOST RECENT SYSTOLIC BLOOD PRESSURE < 130 MM HG: ICD-10-PCS | Mod: CPTII,S$GLB,, | Performed by: STUDENT IN AN ORGANIZED HEALTH CARE EDUCATION/TRAINING PROGRAM

## 2023-05-04 NOTE — PROGRESS NOTES
Pt is here accompanied by her   Pt signed sx consent form.    Informed pt her sx is tanika for 7/25/23 and Pre op labs on 7/17/23. Informed pt a RN will give pt a call for pt sx time.     Surgery instruction provided:   Do not to eat or drink after midnight. No chewing gum or sucking candy   PAT Nurse will give pt a call to go thru pt medications list the day before sx date  Take a shower the night before and the morning of the surgery day with antibacterial soap and not to apply powder, deodorant and body lotion after shower  Will need someone to take pt to the hospital and drive pt home after the surgery  Leave all jewelries and valuable belongings at home.  Remove all body piercing, denture and contact lens  Stop vitamins 1 week before sx   Patient can bring their cell phone.  Wear button front clothing/lose clothing    Post operative instruction given.  Informed pt to keep dressing/wound clean and dry.  Informed pt to avoid any activities that require pulling, pushing, raising arm overhead, and heavy lifting. Avoid strenuous and vigorous activities.   Pt questions and concerns were addressed. Informed pt if she has further questions and concerns to call our office. Telephone number provided

## 2023-05-10 ENCOUNTER — PATIENT OUTREACH (OUTPATIENT)
Dept: ADMINISTRATIVE | Facility: HOSPITAL | Age: 48
End: 2023-05-10
Payer: COMMERCIAL

## 2023-05-10 ENCOUNTER — PATIENT MESSAGE (OUTPATIENT)
Dept: ADMINISTRATIVE | Facility: HOSPITAL | Age: 48
End: 2023-05-10
Payer: COMMERCIAL

## 2023-05-14 DIAGNOSIS — C73 THYROID CANCER: ICD-10-CM

## 2023-05-14 RX ORDER — SODIUM CHLORIDE 9 MG/ML
INJECTION, SOLUTION INTRAVENOUS CONTINUOUS
Status: CANCELLED | OUTPATIENT
Start: 2023-05-14

## 2023-05-18 NOTE — ASSESSMENT & PLAN NOTE
1.1 cm right isthmus nodule with malignant cytology, lymph node mapping negative.  Discussed options for thyroid lobectomy versus upfront total thyroidectomy with associated risks, benefits and alternatives.  Based on a thorough discussion, will proceed to the OR for right thyroid lobectomy and isthmusectomy.

## 2023-05-18 NOTE — ASSESSMENT & PLAN NOTE
Unlikely related to the isthmus nodule, discussed this sensation may not improve postoperatively and in fact could become worse, at least initially after surgery.  If symptoms persist, have offered to coordinate GI follow up.

## 2023-05-26 ENCOUNTER — TELEPHONE (OUTPATIENT)
Dept: ENDOSCOPY | Facility: HOSPITAL | Age: 48
End: 2023-05-26
Payer: COMMERCIAL

## 2023-05-26 VITALS — HEIGHT: 65 IN | BODY MASS INDEX: 20.66 KG/M2 | WEIGHT: 124 LBS

## 2023-05-26 DIAGNOSIS — Z12.11 SPECIAL SCREENING FOR MALIGNANT NEOPLASMS, COLON: Primary | ICD-10-CM

## 2023-05-26 RX ORDER — SODIUM, POTASSIUM,MAG SULFATES 17.5-3.13G
1 SOLUTION, RECONSTITUTED, ORAL ORAL DAILY
Qty: 1 KIT | Refills: 0 | Status: SHIPPED | OUTPATIENT
Start: 2023-05-26 | End: 2023-05-28

## 2023-05-29 ENCOUNTER — PATIENT OUTREACH (OUTPATIENT)
Dept: ADMINISTRATIVE | Facility: HOSPITAL | Age: 48
End: 2023-05-29
Payer: COMMERCIAL

## 2023-06-02 ENCOUNTER — ANESTHESIA EVENT (OUTPATIENT)
Dept: ENDOSCOPY | Facility: HOSPITAL | Age: 48
End: 2023-06-02
Payer: COMMERCIAL

## 2023-06-02 ENCOUNTER — HOSPITAL ENCOUNTER (OUTPATIENT)
Facility: HOSPITAL | Age: 48
Discharge: HOME | End: 2023-06-02
Attending: INTERNAL MEDICINE | Admitting: INTERNAL MEDICINE
Payer: COMMERCIAL

## 2023-06-02 ENCOUNTER — ANESTHESIA (OUTPATIENT)
Dept: ENDOSCOPY | Facility: HOSPITAL | Age: 48
End: 2023-06-02
Payer: COMMERCIAL

## 2023-06-02 ENCOUNTER — PATIENT MESSAGE (OUTPATIENT)
Dept: ENDOSCOPY | Facility: HOSPITAL | Age: 48
End: 2023-06-02
Payer: COMMERCIAL

## 2023-06-02 VITALS
OXYGEN SATURATION: 99 % | WEIGHT: 124 LBS | TEMPERATURE: 98 F | DIASTOLIC BLOOD PRESSURE: 67 MMHG | BODY MASS INDEX: 20.66 KG/M2 | SYSTOLIC BLOOD PRESSURE: 105 MMHG | HEART RATE: 69 BPM | RESPIRATION RATE: 16 BRPM | HEIGHT: 65 IN

## 2023-06-02 DIAGNOSIS — Z12.11 SPECIAL SCREENING FOR MALIGNANT NEOPLASMS, COLON: Primary | ICD-10-CM

## 2023-06-02 DIAGNOSIS — Z12.11 COLON CANCER SCREENING: ICD-10-CM

## 2023-06-02 LAB
B-HCG UR QL: NEGATIVE
CTP QC/QA: YES

## 2023-06-02 PROCEDURE — G0121 COLON CA SCRN NOT HI RSK IND: HCPCS | Mod: ,,, | Performed by: INTERNAL MEDICINE

## 2023-06-02 PROCEDURE — 25000003 PHARM REV CODE 250: Performed by: INTERNAL MEDICINE

## 2023-06-02 PROCEDURE — 81025 URINE PREGNANCY TEST: CPT | Performed by: INTERNAL MEDICINE

## 2023-06-02 PROCEDURE — 37000008 HC ANESTHESIA 1ST 15 MINUTES: Performed by: INTERNAL MEDICINE

## 2023-06-02 PROCEDURE — 25000003 PHARM REV CODE 250: Performed by: NURSE ANESTHETIST, CERTIFIED REGISTERED

## 2023-06-02 PROCEDURE — 63600175 PHARM REV CODE 636 W HCPCS: Performed by: NURSE ANESTHETIST, CERTIFIED REGISTERED

## 2023-06-02 PROCEDURE — 37000009 HC ANESTHESIA EA ADD 15 MINS: Performed by: INTERNAL MEDICINE

## 2023-06-02 PROCEDURE — E9220 PRA ENDO ANESTHESIA: ICD-10-PCS | Mod: ,,, | Performed by: NURSE ANESTHETIST, CERTIFIED REGISTERED

## 2023-06-02 PROCEDURE — G0121 COLON CA SCRN NOT HI RSK IND: HCPCS | Performed by: INTERNAL MEDICINE

## 2023-06-02 PROCEDURE — G0121 COLON CA SCRN NOT HI RSK IND: ICD-10-PCS | Mod: ,,, | Performed by: INTERNAL MEDICINE

## 2023-06-02 PROCEDURE — E9220 PRA ENDO ANESTHESIA: HCPCS | Mod: ,,, | Performed by: NURSE ANESTHETIST, CERTIFIED REGISTERED

## 2023-06-02 RX ORDER — PROPOFOL 10 MG/ML
VIAL (ML) INTRAVENOUS
Status: DISCONTINUED | OUTPATIENT
Start: 2023-06-02 | End: 2023-06-02

## 2023-06-02 RX ORDER — LIDOCAINE HYDROCHLORIDE 20 MG/ML
INJECTION INTRAVENOUS
Status: DISCONTINUED | OUTPATIENT
Start: 2023-06-02 | End: 2023-06-02

## 2023-06-02 RX ORDER — PROPOFOL 10 MG/ML
VIAL (ML) INTRAVENOUS CONTINUOUS PRN
Status: DISCONTINUED | OUTPATIENT
Start: 2023-06-02 | End: 2023-06-02

## 2023-06-02 RX ORDER — SODIUM CHLORIDE 9 MG/ML
INJECTION, SOLUTION INTRAVENOUS CONTINUOUS
Status: DISCONTINUED | OUTPATIENT
Start: 2023-06-02 | End: 2023-06-02 | Stop reason: HOSPADM

## 2023-06-02 RX ADMIN — LIDOCAINE HYDROCHLORIDE 40 MG: 20 INJECTION INTRAVENOUS at 03:06

## 2023-06-02 RX ADMIN — SODIUM CHLORIDE: 0.9 INJECTION, SOLUTION INTRAVENOUS at 03:06

## 2023-06-02 RX ADMIN — GLYCOPYRROLATE 0.1 MG: 0.2 INJECTION, SOLUTION INTRAMUSCULAR; INTRAVENOUS at 03:06

## 2023-06-02 RX ADMIN — PROPOFOL 250 MCG/KG/MIN: 10 INJECTION, EMULSION INTRAVENOUS at 03:06

## 2023-06-02 RX ADMIN — PROPOFOL 20 MG: 10 INJECTION, EMULSION INTRAVENOUS at 03:06

## 2023-06-02 RX ADMIN — PROPOFOL 50 MG: 10 INJECTION, EMULSION INTRAVENOUS at 03:06

## 2023-06-02 NOTE — ANESTHESIA POSTPROCEDURE EVALUATION
Anesthesia Post Evaluation    Patient: Zara Leal    Procedure(s) Performed: Procedure(s) (LRB):  COLONOSCOPY (N/A)    Final Anesthesia Type: general      Patient location during evaluation: GI PACU  Patient participation: Yes- Able to Participate  Level of consciousness: awake and alert  Post-procedure vital signs: reviewed and stable  Pain management: adequate  Airway patency: patent    PONV status at discharge: No PONV  Anesthetic complications: no      Cardiovascular status: blood pressure returned to baseline  Respiratory status: spontaneous ventilation  Hydration status: euvolemic  Follow-up not needed.          Vitals Value Taken Time   /67 06/02/23 1604   Temp 36.4 °C (97.5 °F) 06/02/23 1540   Pulse 69 06/02/23 1604   Resp 16 06/02/23 1604   SpO2 99 % 06/02/23 1604         No case tracking events are documented in the log.      Pain/Alexandre Score: Alexandre Score: 8 (6/2/2023  3:51 PM)

## 2023-06-02 NOTE — ANESTHESIA PREPROCEDURE EVALUATION
06/02/2023  Zara Leal is a 47 y.o., female.      Pre-op Assessment    I have reviewed the Patient Summary Reports.    I have reviewed the NPO Status.   I have reviewed the Medications.     Review of Systems  Anesthesia Hx:  No problems with previous Anesthesia  Denies Family Hx of Anesthesia complications.   Denies Personal Hx of Anesthesia complications.   Social:  Non-Smoker    Hematology/Oncology:  Hematology Normal   Oncology Normal     EENT/Dental:EENT/Dental Normal   Cardiovascular:  Cardiovascular Normal Exercise tolerance: good  >4 METS   Pulmonary:  Pulmonary Normal    Renal/:  Renal/ Normal     Hepatic/GI:  Hepatic/GI Normal    Musculoskeletal:  Musculoskeletal Normal    Neurological:  Neurology Normal    Endocrine:  Endocrine Normal    Dermatological:  Skin Normal    Psych:  Psychiatric Normal           Physical Exam  General: Well nourished, Cooperative and Alert    Airway:  Mouth Opening: Normal  Tongue: Normal        Anesthesia Plan  Type of Anesthesia, risks & benefits discussed:    Anesthesia Type: Gen Natural Airway  Intra-op Monitoring Plan: Standard ASA Monitors  Induction:  IV  Informed Consent: Informed consent signed with the Patient and all parties understand the risks and agree with anesthesia plan.  All questions answered.   ASA Score: 1  Day of Surgery Review of History & Physical: H&P Update referred to the surgeon/provider.    Ready For Surgery From Anesthesia Perspective.     .

## 2023-06-02 NOTE — H&P
Short Stay Endoscopy History and Physical    PCP - Piotr Villasenor MD    Procedure - Colonoscopy  ASA - per anesthesia  Mallampati - per anesthesia  History of Anesthesia problems - no  Family history Anesthesia problems - no   Plan of anesthesia - General    HPI:  This is a 47 y.o. female here for evaluation of : asymptomatic screening exam      ROS:  Constitutional: No fevers, chills, No weight loss  CV: No chest pain  Pulm: No cough, No shortness of breath  GI: see HPI  Derm: No rash    Medical History:  has a past medical history of Endometriosis of pelvis (12/2005), Goiter, and PVC (premature ventricular contraction).    Surgical History:  has a past surgical history that includes uterus septate (12/2005); Tonsillectomy; [place] tooth extraction; and Hysteroscopy with dilation and curettage of uterus (N/A, 8/28/2019).    Family History: family history includes Breast cancer (age of onset: 80) in her paternal grandmother; Melanoma in her father, paternal grandfather, paternal grandmother, and sister.. Otherwise no colon cancer, inflammatory bowel disease, or GI malignancies.    Social History:  reports that she has never smoked. She has never been exposed to tobacco smoke. She has never used smokeless tobacco. She reports that she does not currently use alcohol. She reports that she does not use drugs.    Review of patient's allergies indicates:  No Known Allergies    Medications:   Medications Prior to Admission   Medication Sig Dispense Refill Last Dose    acyclovir (ZOVIRAX) 400 MG tablet 1 po TID  For 3-5 days prn fever blisters (Patient not taking: Reported on 5/4/2023) 30 tablet 12     azithromycin (ZITHROMAX Z-J LUIS) 250 MG tablet 2 pills on day 1, then 1 pill a day (Patient not taking: Reported on 5/4/2023) 6 tablet 0     HYDROcodone-acetaminophen (NORCO) 5-325 mg per tablet Take 1 tablet by mouth every 6 (six) hours as needed for Pain. (Patient not taking: Reported on 5/4/2023) 7 tablet 0      ibuprofen (ADVIL ORAL) Take by mouth as needed.       ibuprofen (ADVIL,MOTRIN) 600 MG tablet Take 1 tablet (600 mg total) by mouth 3 (three) times daily. (Patient not taking: Reported on 5/4/2023) 30 tablet 0     miSOPROStoL (CYTOTEC) 200 MCG Tab Take 1 pill po at 7am and 1 pill po at 11am the morning of endometrial biopsy (Patient not taking: Reported on 5/4/2023) 2 tablet 0     mupirocin (BACTROBAN) 2 % ointment Apply topically 2 (two) times daily. (Patient not taking: Reported on 5/4/2023) 15 g 0     norethindrone-ethinyl estradiol (BLISOVI FE 1/20, 28,) 1 mg-20 mcg (21)/75 mg (7) per tablet Take 1 tablet by mouth once daily. (Patient not taking: Reported on 5/4/2023) 28 tablet 11          Physical Exam:    Vital Signs:   Vitals:    06/02/23 1457   BP: 117/61   Pulse: 83   Resp: 16   Temp: 97.2 °F (36.2 °C)       General Appearance: Well appearing in no acute distress  Eyes:    No scleral icterus  ENT: Neck supple, Lips, mucosa, and tongue normal; teeth and gums normal  Abdomen: Soft, non tender, non distended with positive bowel sounds. No hepatosplenomegaly, ascites, or mass.  Extremities: 2+ pulses, no clubbing, cyanosis or edema  Skin: No rash      Labs:  Lab Results   Component Value Date    WBC 5.32 03/26/2021    HGB 9.7 (L) 03/26/2021    HCT 33.5 (L) 03/26/2021     03/26/2021    CHOL 163 08/15/2019    TRIG 43 08/15/2019    HDL 67 08/15/2019    ALT 14 03/26/2021    AST 16 03/26/2021     03/26/2021    K 4.1 03/26/2021     03/26/2021    CREATININE 0.8 03/26/2021    BUN 9 03/26/2021    CO2 26 03/26/2021    TSH 2.219 03/01/2023    INR 1.0 08/15/2019       I have explained the risks and benefits of endoscopy procedures to the patient including but not limited to bleeding, perforation, infection, and death.  The patient was asked if they understand and allowed to ask any further questions to their satisfaction.    Callum Boyd MD

## 2023-06-02 NOTE — PROVATION PATIENT INSTRUCTIONS
Discharge Summary/Instructions after an Endoscopic Procedure  Patient Name: Zara Leal  Patient MRN: [id number]  Patient YOB: 1975 Friday, June 2, 2023  Callum Boyd MD  Dear patient,  As a result of recent federal legislation (The Federal Cures Act), you may   receive lab or pathology results from your procedure in your [facility]   account before your physician is able to contact you. Your physician or   their representative will relay the results to you with their   recommendations at their soonest availability.  Thank you,  RESTRICTIONS:  During your procedure today, you received medications for sedation.  These   medications may affect your judgment, balance and coordination.  Therefore,   for 24 hours, you have the following restrictions:   - DO NOT drive a car, operate machinery, make legal/financial decisions,   sign important papers or drink alcohol.    ACTIVITY:  Today: no heavy lifting, straining or running due to procedural   sedation/anesthesia.  The following day: return to full activity including work.  DIET:  Eat and drink normally unless instructed otherwise.     TREATMENT FOR COMMON SIDE EFFECTS:  - Mild abdominal pain, nausea, belching, bloating or excessive gas:  rest,   eat lightly and use a heating pad.  - Sore Throat: treat with throat lozenges and/or gargle with warm salt   water.  - Because air was used during the procedure, expelling large amounts of air   from your rectum or belching is normal.  - If a bowel prep was taken, you may not have a bowel movement for 1-3 days.    This is normal.  SYMPTOMS TO WATCH FOR AND REPORT TO YOUR PHYSICIAN:  1. Abdominal pain or bloating, other than gas cramps.  2. Chest pain.  3. Back pain.  4. Signs of infection such as: chills or fever occurring within 24 hours   after the procedure.  5. Rectal bleeding, which would show as bright red, maroon, or black stools.   (A tablespoon of blood from the rectum is not serious, especially if    hemorrhoids are present.)  6. Vomiting.  7. Weakness or dizziness.  GO DIRECTLY TO THE NEAREST EMERGENCY ROOM IF YOU HAVE ANY OF THE FOLLOWING:      Difficulty breathing              Chills and/or fever over 101 F   Persistent vomiting and/or vomiting blood   Severe abdominal pain   Severe chest pain   Black, tarry stools   Bleeding- more than one tablespoon   Any other symptom or condition that you feel may need urgent attention  Your doctor recommends these additional instructions:  If any biopsies were taken, your doctors clinic will contact you in 1 to 2   weeks with any results.  - Discharge patient to home (ambulatory).   - Patient has a contact number available for emergencies.  The signs and   symptoms of potential delayed complications were discussed with the   patient.  Return to normal activities tomorrow.  Written discharge   instructions were provided to the patient.   - Resume previous diet.   - Continue present medications.   - Return to primary care physician as previously scheduled.   - Repeat colonoscopy in 10 years for screening purposes.   - The findings and recommendations were discussed with the designated   responsible adult.  For questions, problems or results please call your physician - Callum Boyd MD at Work:  [phone].  [facility] [place], EMERGENCY ROOM PHONE NUMBER: [phone]  IF A COMPLICATION OR EMERGENCY SITUATION ARISES AND YOU ARE UNABLE TO REACH   YOUR PHYSICIAN - GO DIRECTLY TO THE EMERGENCY ROOM.  Callum Boyd MD  6/2/2023 3:34:59 PM  This report has been verified and signed electronically.  Dear patient,  As a result of recent federal legislation (The Federal Cures Act), you may   receive lab or pathology results from your procedure in your [facility]   account before your physician is able to contact you. Your physician or   their representative will relay the results to you with their   recommendations at their soonest availability.  Thank you,  PROVATION

## 2023-06-02 NOTE — PLAN OF CARE
ID band verified and applied.  Plan of care reviewed with patient.  Patient verbalized understanding.    08:29

## 2023-06-02 NOTE — TRANSFER OF CARE
"Anesthesia Transfer of Care Note    Patient: Zara Leal    Procedure(s) Performed: Procedure(s) (LRB):  COLONOSCOPY (N/A)    Patient location: PACU    Anesthesia Type: general    Transport from OR: Transported from OR on room air with adequate spontaneous ventilation    Post pain: adequate analgesia    Post assessment: no apparent anesthetic complications    Post vital signs: stable    Level of consciousness: responds to stimulation    Nausea/Vomiting: no nausea/vomiting    Complications: none    Transfer of care protocol was followed      Last vitals:   Visit Vitals  BP 84/52 (BP Location: Left arm, Patient Position: Lying)   Pulse 83   Temp 36.2 °C (97.2 °F)   Resp 16   Ht 5' 5" (1.651 m)   Wt 56.2 kg (124 lb)   LMP 05/17/2023 (Approximate)   SpO2 100%   Breastfeeding No   BMI 20.63 kg/m²     "

## 2023-06-15 RX ORDER — ACYCLOVIR 400 MG/1
TABLET ORAL
Qty: 30 TABLET | Refills: 11 | Status: SHIPPED | OUTPATIENT
Start: 2023-06-15

## 2023-06-15 RX ORDER — AZITHROMYCIN 250 MG/1
TABLET, FILM COATED ORAL
Qty: 6 TABLET | Refills: 0 | Status: SHIPPED | OUTPATIENT
Start: 2023-06-15 | End: 2023-06-25

## 2023-06-15 RX ORDER — NORETHINDRONE ACETATE AND ETHINYL ESTRADIOL 1MG-20(21)
1 KIT ORAL DAILY
Qty: 30 TABLET | Refills: 7 | Status: SHIPPED | OUTPATIENT
Start: 2023-06-15 | End: 2024-03-22

## 2023-06-15 NOTE — TELEPHONE ENCOUNTER
----- Message from Mai Flaherty sent at 6/15/2023 10:38 AM CDT -----  Regarding: Refill request  Type:  RX Refill Request    Who Called: CHRISTINA COSTA [[id number]]    Refill or New Rx: Refill     RX Name and Strength: norethindrone-ethinyl estradiol (BLISOVI FE 1/20, 28,) 1 mg-20 mcg (21)/75 mg (7) per tablet     How is the patient currently taking it? (ex. 1XDay)    Is this a 30 day or 90 day RX:    Preferred Pharmacy with phone number: [facility] #[id number] - [place], [place] - [address]    Local or Mail Order: local      Ordering Provider: Cintia Fontanez Call Back Number:  [phone]    Additional Information: She is concerned her mediations were cancelled during her colonoscopy and needs a new script.

## 2023-07-14 ENCOUNTER — TELEPHONE (OUTPATIENT)
Dept: SURGERY | Facility: CLINIC | Age: 48
End: 2023-07-14
Payer: COMMERCIAL

## 2023-07-14 NOTE — TELEPHONE ENCOUNTER
Returned pt call and pt wants to r/s her pre op lab time to a later time. Pt pre  op labs time has been r/s to   1 pm.   Pt acknowledge pre op time.

## 2023-07-14 NOTE — TELEPHONE ENCOUNTER
----- Message from Alissa Jameson sent at 7/14/2023  8:29 AM CDT -----  Regarding: pt called  Name of Who is Calling: CHRISTINA COSTA [[id number]]      What is the request in detail: pt is requesting to see if she reschedules her pre op labs to another day will this affect her procedure. Please advise       Can the clinic reply by [facility]: No      What Number to Call Back if not in [facility]: Telephone Information:  Mobile          [phone]

## 2023-07-17 ENCOUNTER — LAB VISIT (OUTPATIENT)
Dept: LAB | Facility: HOSPITAL | Age: 48
End: 2023-07-17
Attending: STUDENT IN AN ORGANIZED HEALTH CARE EDUCATION/TRAINING PROGRAM
Payer: COMMERCIAL

## 2023-07-17 DIAGNOSIS — E04.1 THYROID NODULE: ICD-10-CM

## 2023-07-17 DIAGNOSIS — E04.1 THYROID NODULE: Primary | ICD-10-CM

## 2023-07-17 LAB
ALBUMIN SERPL BCP-MCNC: 3.7 G/DL (ref 3.5–5.2)
ALP SERPL-CCNC: 43 U/L (ref 55–135)
ALT SERPL W/O P-5'-P-CCNC: 20 U/L (ref 10–44)
ANION GAP SERPL CALC-SCNC: 8 MMOL/L (ref 8–16)
AST SERPL-CCNC: 19 U/L (ref 10–40)
BASOPHILS # BLD AUTO: 0.03 K/UL (ref 0–0.2)
BASOPHILS NFR BLD: 0.5 % (ref 0–1.9)
BILIRUB SERPL-MCNC: 0.6 MG/DL (ref 0.1–1)
BUN SERPL-MCNC: 8 MG/DL (ref 6–20)
CALCIUM SERPL-MCNC: 8.9 MG/DL (ref 8.7–10.5)
CHLORIDE SERPL-SCNC: 108 MMOL/L (ref 95–110)
CO2 SERPL-SCNC: 26 MMOL/L (ref 23–29)
CREAT SERPL-MCNC: 0.7 MG/DL (ref 0.5–1.4)
DIFFERENTIAL METHOD: ABNORMAL
EOSINOPHIL # BLD AUTO: 0.1 K/UL (ref 0–0.5)
EOSINOPHIL NFR BLD: 1.6 % (ref 0–8)
ERYTHROCYTE [DISTWIDTH] IN BLOOD BY AUTOMATED COUNT: 12.6 % (ref 11.5–14.5)
EST. GFR  (NO RACE VARIABLE): >60 ML/MIN/1.73 M^2
GLUCOSE SERPL-MCNC: 104 MG/DL (ref 70–110)
HCT VFR BLD AUTO: 35.2 % (ref 37–48.5)
HGB BLD-MCNC: 11.2 G/DL (ref 12–16)
IMM GRANULOCYTES # BLD AUTO: 0.01 K/UL (ref 0–0.04)
IMM GRANULOCYTES NFR BLD AUTO: 0.2 % (ref 0–0.5)
LYMPHOCYTES # BLD AUTO: 1.6 K/UL (ref 1–4.8)
LYMPHOCYTES NFR BLD: 28.3 % (ref 18–48)
MCH RBC QN AUTO: 28.6 PG (ref 27–31)
MCHC RBC AUTO-ENTMCNC: 31.8 G/DL (ref 32–36)
MCV RBC AUTO: 90 FL (ref 82–98)
MONOCYTES # BLD AUTO: 0.3 K/UL (ref 0.3–1)
MONOCYTES NFR BLD: 5.3 % (ref 4–15)
NEUTROPHILS # BLD AUTO: 3.6 K/UL (ref 1.8–7.7)
NEUTROPHILS NFR BLD: 64.1 % (ref 38–73)
NRBC BLD-RTO: 0 /100 WBC
PLATELET # BLD AUTO: 171 K/UL (ref 150–450)
PMV BLD AUTO: 8.7 FL (ref 9.2–12.9)
POTASSIUM SERPL-SCNC: 4.2 MMOL/L (ref 3.5–5.1)
PROT SERPL-MCNC: 6.2 G/DL (ref 6–8.4)
RBC # BLD AUTO: 3.92 M/UL (ref 4–5.4)
SODIUM SERPL-SCNC: 142 MMOL/L (ref 136–145)
WBC # BLD AUTO: 5.62 K/UL (ref 3.9–12.7)

## 2023-07-17 PROCEDURE — 85025 COMPLETE CBC W/AUTO DIFF WBC: CPT | Performed by: STUDENT IN AN ORGANIZED HEALTH CARE EDUCATION/TRAINING PROGRAM

## 2023-07-17 PROCEDURE — 80053 COMPREHEN METABOLIC PANEL: CPT | Performed by: STUDENT IN AN ORGANIZED HEALTH CARE EDUCATION/TRAINING PROGRAM

## 2023-07-17 PROCEDURE — 36415 COLL VENOUS BLD VENIPUNCTURE: CPT | Performed by: STUDENT IN AN ORGANIZED HEALTH CARE EDUCATION/TRAINING PROGRAM

## 2023-07-24 ENCOUNTER — TELEPHONE (OUTPATIENT)
Dept: SURGERY | Facility: CLINIC | Age: 48
End: 2023-07-24
Payer: COMMERCIAL

## 2023-07-24 ENCOUNTER — ANESTHESIA EVENT (OUTPATIENT)
Dept: SURGERY | Facility: HOSPITAL | Age: 48
End: 2023-07-24
Payer: COMMERCIAL

## 2023-07-24 NOTE — TELEPHONE ENCOUNTER
Pt notified to arrive at [facility] tomorrow 7/25/23 at 5:15am for surgery with Dr. Cohen.  Pre-op instructions reinforced.  She verbalized understanding.

## 2023-07-24 NOTE — ANESTHESIA PREPROCEDURE EVALUATION
07/24/2023  Pre-operative evaluation for Procedure(s) (LRB):  RIGHT LOBECTOMY, THYROID WITH UNILATERAL NIM NERVE MONITORING (Right)    Zara Leal is a 48 y.o. female     Patient Active Problem List   Diagnosis    Palpitations    Premature ventricular contractions (PVCs) (VPCs)    Weakness    Posture imbalance    Chronic pelvic pain in female    Status post hysteroscopy with polypectomy and D&C     Thyroid nodule    Globus sensation       Review of patient's allergies indicates:  No Known Allergies    No current facility-administered medications on file prior to encounter.     Current Outpatient Medications on File Prior to Encounter   Medication Sig Dispense Refill    acyclovir (ZOVIRAX) 400 MG tablet 1 po TID  For 3-5 days prn fever blisters 30 tablet 11       Past Surgical History:   Procedure Laterality Date    COLONOSCOPY N/A 6/2/2023    Procedure: COLONOSCOPY;  Surgeon: Callum Boyd MD;  Location: [address]);  Service: Endoscopy;  Laterality: N/A;  Instructions to portal. Pt. does not want Anesthesia.EC  4/27/23 no answer -egh  5/26/23 - Pt r/s - prep ins. on portal - suprep per Pt request - Pt ok with any MD any location- [facility]  pre call 5/29, no answer -    HYSTEROSCOPY WITH DILATION AND CURETTAGE OF UTERUS N/A 8/28/2019    Procedure: HYSTEROSCOPY, WITH DILATION AND CURETTAGE OF UTERUS;  Surgeon: Maryana Chamberlain MD;  Location: [facility];  Service: OB/GYN;  Laterality: N/A;    TONSILLECTOMY      uterus septate  12/2005    WISDOM TOOTH EXTRACTION         BP Readings from Last 3 Encounters:   06/02/23 105/67   05/04/23 (!) 90/51   03/15/23 95/66     CBC: No results for input(s): WBC, RBC, HGB, HCT, PLT, MCV, MCH, MCHC in the last 72 hours.    CMP: No results for input(s): NA, K, CL, CO2, BUN, CREATININE, GLU, MG, PHOS, CALCIUM, ALBUMIN, PROT, ALKPHOS, ALT, AST,  BILITOT in the last 72 hours.    INR  No results for input(s): PT, INR, PROTIME, APTT in the last 72 hours.     Latest Reference Range & Units Most Recent   TSH 0.400 - 4.000 uIU/mL 2.219  3/1/23 12:18   T3, Free 2.3 - 4.2 pg/mL 2.1 (L)  4/19/21 09:30   Free T4 0.71 - 1.51 ng/dL 0.91  4/19/21 09:30   (L): Data is abnormally low    Diagnostic Studies:      EKG:  Normal sinus rhythm   Normal ECG   When compared with ECG of 11-MAR-2020 07:37,   No significant change was found   Confirmed by Bandar HSIEH MD ([id number]) on 5/4/2023 3:33:01 PM     2D Echo:  Results for orders placed or performed during the hospital encounter of 07/08/14   2D echo with color flow doppler   Result Value Ref Range    EF + QEF 55     Diastolic Dysfunction No              Pre-op Assessment    I have reviewed the Patient Summary Reports.     I have reviewed the Nursing Notes.       Review of Systems  Anesthesia Hx:  No problems with previous Anesthesia    Hematology/Oncology:  Hematology Normal      Current/Recent Cancer.   EENT/Dental:EENT/Dental Normal   Cardiovascular:   Exercise tolerance: good  Disorder of Cardiac Rhythm, Premature Ventricular Contraction (PVC)    Pulmonary:  Pulmonary Normal    Renal/:  Renal/ Normal     Hepatic/GI:  Esophageal / Stomach Disorders Esophageal Disorder, dysphagia    Musculoskeletal:  Musculoskeletal Normal    Neurological:  Neurology Normal    Endocrine:  Endocrine Normal  Thyroid Disease Thyroid Cancer, recent diagnosis 1.1 cm papillary thyroid cancer in a right isthmus nodule   Psych:  Psychiatric Normal           Physical Exam  General: Well nourished, Cooperative and Alert    Airway:  Mallampati: II   Mouth Opening: Normal  TM Distance: Normal          Anesthesia Plan  Type of Anesthesia, risks & benefits discussed:    Anesthesia Type: Gen ETT  Intra-op Monitoring Plan: Standard ASA Monitors  Post Op Pain Control Plan: multimodal analgesia and IV/PO Opioids PRN  Induction:  IV  Airway Plan: Video  ASA  Score: 2  Day of Surgery Review of History & Physical: H&P Update referred to the surgeon/provider.    Ready For Surgery From Anesthesia Perspective.     .

## 2023-07-24 NOTE — PRE-PROCEDURE INSTRUCTIONS
PreOp Instructions given:   - Verbal medication information (what to hold and what to take)   - NPO guidelines   - Arrival place directions given; time to be given the day before procedure by the   Surgeon's Office DOS - 0515  - Bathing with antibacterial soap   - Don't wear any jewelry or bring any valuables AM of surgery   - No makeup or moisturizer to face   - No perfume/cologne, powder, lotions or aftershave   Pt. verbalized understanding.   Pt denies any h/o Anesthesia/Sedation complications or side effects.

## 2023-07-25 ENCOUNTER — PATIENT MESSAGE (OUTPATIENT)
Dept: SURGERY | Facility: HOSPITAL | Age: 48
End: 2023-07-25
Payer: COMMERCIAL

## 2023-07-25 ENCOUNTER — HOSPITAL ENCOUNTER (OUTPATIENT)
Facility: HOSPITAL | Age: 48
Discharge: HOME | End: 2023-07-25
Attending: STUDENT IN AN ORGANIZED HEALTH CARE EDUCATION/TRAINING PROGRAM | Admitting: STUDENT IN AN ORGANIZED HEALTH CARE EDUCATION/TRAINING PROGRAM
Payer: COMMERCIAL

## 2023-07-25 ENCOUNTER — NURSE TRIAGE (OUTPATIENT)
Dept: ADMINISTRATIVE | Facility: CLINIC | Age: 48
End: 2023-07-25
Payer: COMMERCIAL

## 2023-07-25 ENCOUNTER — ANESTHESIA (OUTPATIENT)
Dept: SURGERY | Facility: HOSPITAL | Age: 48
End: 2023-07-25
Payer: COMMERCIAL

## 2023-07-25 VITALS
HEIGHT: 65 IN | RESPIRATION RATE: 16 BRPM | OXYGEN SATURATION: 98 % | DIASTOLIC BLOOD PRESSURE: 60 MMHG | BODY MASS INDEX: 20.83 KG/M2 | WEIGHT: 125 LBS | SYSTOLIC BLOOD PRESSURE: 103 MMHG | TEMPERATURE: 98 F | HEART RATE: 70 BPM

## 2023-07-25 DIAGNOSIS — C73 THYROID CANCER: Primary | ICD-10-CM

## 2023-07-25 PROCEDURE — 25000003 PHARM REV CODE 250: Performed by: STUDENT IN AN ORGANIZED HEALTH CARE EDUCATION/TRAINING PROGRAM

## 2023-07-25 PROCEDURE — 88307 PR  SURG PATH,LEVEL V: ICD-10-PCS | Mod: 26,,, | Performed by: PATHOLOGY

## 2023-07-25 PROCEDURE — 71000016 HC POSTOP RECOV ADDL HR: Performed by: STUDENT IN AN ORGANIZED HEALTH CARE EDUCATION/TRAINING PROGRAM

## 2023-07-25 PROCEDURE — 71000015 HC POSTOP RECOV 1ST HR: Performed by: STUDENT IN AN ORGANIZED HEALTH CARE EDUCATION/TRAINING PROGRAM

## 2023-07-25 PROCEDURE — 60220 PR THYROID LOBECTOMY,UNILAT: ICD-10-PCS | Mod: RT,,, | Performed by: STUDENT IN AN ORGANIZED HEALTH CARE EDUCATION/TRAINING PROGRAM

## 2023-07-25 PROCEDURE — 27201423 OPTIME MED/SURG SUP & DEVICES STERILE SUPPLY: Performed by: STUDENT IN AN ORGANIZED HEALTH CARE EDUCATION/TRAINING PROGRAM

## 2023-07-25 PROCEDURE — D9220A PRA ANESTHESIA: Mod: ,,, | Performed by: ANESTHESIOLOGY

## 2023-07-25 PROCEDURE — 37000009 HC ANESTHESIA EA ADD 15 MINS: Performed by: STUDENT IN AN ORGANIZED HEALTH CARE EDUCATION/TRAINING PROGRAM

## 2023-07-25 PROCEDURE — 88307 TISSUE EXAM BY PATHOLOGIST: CPT | Performed by: PATHOLOGY

## 2023-07-25 PROCEDURE — 37000008 HC ANESTHESIA 1ST 15 MINUTES: Performed by: STUDENT IN AN ORGANIZED HEALTH CARE EDUCATION/TRAINING PROGRAM

## 2023-07-25 PROCEDURE — 88307 TISSUE EXAM BY PATHOLOGIST: CPT | Mod: 26,,, | Performed by: PATHOLOGY

## 2023-07-25 PROCEDURE — 36000707: Performed by: STUDENT IN AN ORGANIZED HEALTH CARE EDUCATION/TRAINING PROGRAM

## 2023-07-25 PROCEDURE — 60220 PARTIAL REMOVAL OF THYROID: CPT | Mod: RT,,, | Performed by: STUDENT IN AN ORGANIZED HEALTH CARE EDUCATION/TRAINING PROGRAM

## 2023-07-25 PROCEDURE — 63600175 PHARM REV CODE 636 W HCPCS: Performed by: STUDENT IN AN ORGANIZED HEALTH CARE EDUCATION/TRAINING PROGRAM

## 2023-07-25 PROCEDURE — D9220A PRA ANESTHESIA: ICD-10-PCS | Mod: ,,, | Performed by: ANESTHESIOLOGY

## 2023-07-25 PROCEDURE — 63600175 PHARM REV CODE 636 W HCPCS: Performed by: ANESTHESIOLOGY

## 2023-07-25 PROCEDURE — 36000706: Performed by: STUDENT IN AN ORGANIZED HEALTH CARE EDUCATION/TRAINING PROGRAM

## 2023-07-25 PROCEDURE — 71000033 HC RECOVERY, INTIAL HOUR: Performed by: STUDENT IN AN ORGANIZED HEALTH CARE EDUCATION/TRAINING PROGRAM

## 2023-07-25 PROCEDURE — 25000003 PHARM REV CODE 250: Performed by: ANESTHESIOLOGY

## 2023-07-25 PROCEDURE — 94761 N-INVAS EAR/PLS OXIMETRY MLT: CPT

## 2023-07-25 RX ORDER — MIDAZOLAM HYDROCHLORIDE 1 MG/ML
INJECTION, SOLUTION INTRAMUSCULAR; INTRAVENOUS
Status: DISCONTINUED | OUTPATIENT
Start: 2023-07-25 | End: 2023-07-25

## 2023-07-25 RX ORDER — BUPIVACAINE HYDROCHLORIDE 2.5 MG/ML
INJECTION, SOLUTION EPIDURAL; INFILTRATION; INTRACAUDAL
Status: DISCONTINUED | OUTPATIENT
Start: 2023-07-25 | End: 2023-07-25 | Stop reason: HOSPADM

## 2023-07-25 RX ORDER — METOCLOPRAMIDE HYDROCHLORIDE 5 MG/ML
10 INJECTION INTRAMUSCULAR; INTRAVENOUS EVERY 10 MIN PRN
Status: DISCONTINUED | OUTPATIENT
Start: 2023-07-25 | End: 2023-07-25 | Stop reason: HOSPADM

## 2023-07-25 RX ORDER — PHENYLEPHRINE HCL IN 0.9% NACL 1 MG/10 ML
SYRINGE (ML) INTRAVENOUS
Status: DISCONTINUED | OUTPATIENT
Start: 2023-07-25 | End: 2023-07-25

## 2023-07-25 RX ORDER — OXYCODONE HYDROCHLORIDE 5 MG/1
5 TABLET ORAL EVERY 4 HOURS PRN
Qty: 8 TABLET | Refills: 0 | Status: SHIPPED | OUTPATIENT
Start: 2023-07-25 | End: 2023-08-03 | Stop reason: ALTCHOICE

## 2023-07-25 RX ORDER — LIDOCAINE HYDROCHLORIDE 20 MG/ML
INJECTION, SOLUTION EPIDURAL; INFILTRATION; INTRACAUDAL; PERINEURAL
Status: DISCONTINUED | OUTPATIENT
Start: 2023-07-25 | End: 2023-07-25

## 2023-07-25 RX ORDER — ACETAMINOPHEN 500 MG
1000 TABLET ORAL ONCE
Status: DISCONTINUED | OUTPATIENT
Start: 2023-07-25 | End: 2023-07-25 | Stop reason: HOSPADM

## 2023-07-25 RX ORDER — KETAMINE HCL IN 0.9 % NACL 50 MG/5 ML
SYRINGE (ML) INTRAVENOUS
Status: DISCONTINUED | OUTPATIENT
Start: 2023-07-25 | End: 2023-07-25

## 2023-07-25 RX ORDER — HYDROMORPHONE HYDROCHLORIDE 1 MG/ML
INJECTION, SOLUTION INTRAMUSCULAR; INTRAVENOUS; SUBCUTANEOUS
Status: DISCONTINUED | OUTPATIENT
Start: 2023-07-25 | End: 2023-07-25

## 2023-07-25 RX ORDER — FENTANYL CITRATE 50 UG/ML
INJECTION, SOLUTION INTRAMUSCULAR; INTRAVENOUS
Status: DISCONTINUED | OUTPATIENT
Start: 2023-07-25 | End: 2023-07-25

## 2023-07-25 RX ORDER — SODIUM CHLORIDE 9 MG/ML
INJECTION, SOLUTION INTRAVENOUS CONTINUOUS
Status: DISCONTINUED | OUTPATIENT
Start: 2023-07-25 | End: 2023-07-25 | Stop reason: HOSPADM

## 2023-07-25 RX ORDER — HYDROMORPHONE HYDROCHLORIDE 1 MG/ML
0.2 INJECTION, SOLUTION INTRAMUSCULAR; INTRAVENOUS; SUBCUTANEOUS EVERY 5 MIN PRN
Status: DISCONTINUED | OUTPATIENT
Start: 2023-07-25 | End: 2023-07-25 | Stop reason: HOSPADM

## 2023-07-25 RX ORDER — HALOPERIDOL 5 MG/ML
0.5 INJECTION INTRAMUSCULAR EVERY 10 MIN PRN
Status: DISCONTINUED | OUTPATIENT
Start: 2023-07-25 | End: 2023-07-25 | Stop reason: HOSPADM

## 2023-07-25 RX ORDER — PROPOFOL 10 MG/ML
VIAL (ML) INTRAVENOUS
Status: DISCONTINUED | OUTPATIENT
Start: 2023-07-25 | End: 2023-07-25

## 2023-07-25 RX ORDER — SUCCINYLCHOLINE CHLORIDE 20 MG/ML
INJECTION INTRAMUSCULAR; INTRAVENOUS
Status: DISCONTINUED | OUTPATIENT
Start: 2023-07-25 | End: 2023-07-25

## 2023-07-25 RX ORDER — SODIUM CHLORIDE 0.9 % (FLUSH) 0.9 %
10 SYRINGE (ML) INJECTION
Status: DISCONTINUED | OUTPATIENT
Start: 2023-07-25 | End: 2023-07-25 | Stop reason: HOSPADM

## 2023-07-25 RX ORDER — ONDANSETRON 2 MG/ML
INJECTION INTRAMUSCULAR; INTRAVENOUS
Status: DISCONTINUED | OUTPATIENT
Start: 2023-07-25 | End: 2023-07-25

## 2023-07-25 RX ORDER — ACETAMINOPHEN 10 MG/ML
INJECTION, SOLUTION INTRAVENOUS
Status: DISCONTINUED | OUTPATIENT
Start: 2023-07-25 | End: 2023-07-25

## 2023-07-25 RX ORDER — CALCIUM CARBONATE 400(1000)
2 TABLET,CHEWABLE ORAL 2 TIMES DAILY WITH MEALS
Refills: 0 | COMMUNITY
Start: 2023-07-25 | End: 2023-08-03 | Stop reason: ALTCHOICE

## 2023-07-25 RX ORDER — DEXAMETHASONE SODIUM PHOSPHATE 4 MG/ML
INJECTION, SOLUTION INTRA-ARTICULAR; INTRALESIONAL; INTRAMUSCULAR; INTRAVENOUS; SOFT TISSUE
Status: DISCONTINUED | OUTPATIENT
Start: 2023-07-25 | End: 2023-07-25

## 2023-07-25 RX ADMIN — Medication 100 MCG: at 08:07

## 2023-07-25 RX ADMIN — Medication 100 MCG: at 07:07

## 2023-07-25 RX ADMIN — ACETAMINOPHEN 1000 MG: 10 INJECTION INTRAVENOUS at 07:07

## 2023-07-25 RX ADMIN — FENTANYL CITRATE 100 MCG: 50 INJECTION INTRAMUSCULAR; INTRAVENOUS at 07:07

## 2023-07-25 RX ADMIN — SODIUM CHLORIDE: 0.9 INJECTION, SOLUTION INTRAVENOUS at 07:07

## 2023-07-25 RX ADMIN — HYDROMORPHONE HYDROCHLORIDE 0.6 MG: 1 INJECTION, SOLUTION INTRAMUSCULAR; INTRAVENOUS; SUBCUTANEOUS at 07:07

## 2023-07-25 RX ADMIN — SODIUM CHLORIDE, SODIUM GLUCONATE, SODIUM ACETATE, POTASSIUM CHLORIDE, MAGNESIUM CHLORIDE, SODIUM PHOSPHATE, DIBASIC, AND POTASSIUM PHOSPHATE: .53; .5; .37; .037; .03; .012; .00082 INJECTION, SOLUTION INTRAVENOUS at 07:07

## 2023-07-25 RX ADMIN — ONDANSETRON 4 MG: 2 INJECTION INTRAMUSCULAR; INTRAVENOUS at 09:07

## 2023-07-25 RX ADMIN — Medication 10 MG: at 09:07

## 2023-07-25 RX ADMIN — HYDROMORPHONE HYDROCHLORIDE 0.2 MG: 1 INJECTION, SOLUTION INTRAMUSCULAR; INTRAVENOUS; SUBCUTANEOUS at 10:07

## 2023-07-25 RX ADMIN — SUCCINYLCHOLINE 80 MG: 20 INJECTION, SOLUTION INTRAMUSCULAR; INTRAVENOUS at 07:07

## 2023-07-25 RX ADMIN — Medication 10 MG: at 08:07

## 2023-07-25 RX ADMIN — DEXAMETHASONE SODIUM PHOSPHATE 8 MG: 4 INJECTION INTRA-ARTICULAR; INTRALESIONAL; INTRAMUSCULAR; INTRAVENOUS; SOFT TISSUE at 07:07

## 2023-07-25 RX ADMIN — Medication 30 MG: at 07:07

## 2023-07-25 RX ADMIN — LIDOCAINE HYDROCHLORIDE 100 MG: 20 INJECTION, SOLUTION EPIDURAL; INFILTRATION; INTRACAUDAL at 07:07

## 2023-07-25 RX ADMIN — PROPOFOL 120 MG: 10 INJECTION, EMULSION INTRAVENOUS at 07:07

## 2023-07-25 RX ADMIN — MIDAZOLAM 2 MG: 1 INJECTION INTRAMUSCULAR; INTRAVENOUS at 07:07

## 2023-07-25 NOTE — PROGRESS NOTES
Patient discharged from PACU to home via wheelchair with . Patient stable, no N/V, IV removed, discharged instructions provided. Patient informed to call [facility] for any questions or concerns.

## 2023-07-25 NOTE — ANESTHESIA POSTPROCEDURE EVALUATION
Anesthesia Post Evaluation    Patient: Zara Leal    Procedure(s) Performed: Procedure(s) (LRB):  RIGHT LOBECTOMY, THYROID AND ISTHMUSECTOMY (Right)    Final Anesthesia Type: general      Patient location during evaluation: PACU  Patient participation: Yes- Able to Participate  Level of consciousness: awake and alert  Post-procedure vital signs: reviewed and stable  Pain management: adequate  Airway patency: patent    PONV status at discharge: No PONV  Anesthetic complications: no      Cardiovascular status: blood pressure returned to baseline  Respiratory status: unassisted  Hydration status: euvolemic  Follow-up not needed.          Vitals Value Taken Time   /60 07/25/23 1201   Temp 36.9 °C (98.4 °F) 07/25/23 1200   Pulse 69 07/25/23 1211   Resp 10 07/25/23 1208   SpO2 99 % 07/25/23 1211   Vitals shown include unvalidated device data.      Event Time   Out of Recovery 10:15:00         Pain/Alexandre Score: Pain Rating Prior to Med Admin: 8 (7/25/2023 10:22 AM)  Alexandre Score: 10 (7/25/2023 10:15 AM)

## 2023-07-25 NOTE — ANESTHESIA PROCEDURE NOTES
Intubation    Date/Time: 7/25/2023 7:22 AM  Performed by: Kareem George III, MD  Authorized by: Kareem George III, MD     Intubation:     Induction:  Intravenous    Intubated:  Postinduction    Mask Ventilation:  Easy mask    Attempts:  1    Attempted By:  Staff anesthesiologist    Method of Intubation:  Video laryngoscopy    Blade:  Licona 3    Laryngeal View Grade: Grade I - full view of cords      Difficult Airway Encountered?: No      Complications:  None    Airway Device:  Oral endotracheal tube and EMG ETT (NIMS)    Airway Device Size:  7.0    Style/Cuff Inflation:  Cuffed (inflated to minimal occlusive pressure)    Secured at:  The lips    Placement Verified By:  Capnometry    Complicating Factors:  None    Findings Post-Intubation:  BS equal bilateral and atraumatic/condition of teeth unchanged

## 2023-07-25 NOTE — H&P
The patient has been examined and the H&P has been reviewed:    I concur with the findings and changes have been noted since the H&P was written: No new changes since last office visit. All questions answered. To OR for Right thyroid lobectomy.    Surgery risks, benefits and alternative options discussed and understood by patient/family.    Mitzy Denson MD  PGY-4, General Surgery  [facility]      Endocrine Surgery History & Physical      REFERRING PROVIDER: Jacqueline English MD     REASON FOR VISIT: Thyroid cancer, right 1.1cm thyroid nodule within the isthmus with cytology diagnostic of papillary thyroid cancer     HPI: Zara Leal is a 47 y.o. female patient with a history notable for palpitations and PVCs who presents in consultation for management of a newly diagnosed 1.1 cm papillary thyroid cancer in a right isthmus nodule. She reports a recent ultrasound in Feb 2023 for globus sensation and dysphagia. She was seen in by Dr. English in 3/1/23 for this right isthmus nodule that met FNA criteria.  Cytology was malignant, diagnostic of papillary thyroid carcinoma.      Details of the workup are as follows:      Recent laboratory studies:  TSH: 2.219  Vitamin D: 35 in 2021     Thyroid and Neck Imaging:  Most recent thyroid ultrasound: 02/18/2023  Dominant thyroid nodules: cystic sub-centimeter right inferior thyroid nodule, 1.1cm hypoechoic and solid right isthmus nodule, sub-centimeter isthmus nodule  Lymph node mapping: negative for pathologic lymphadenopathy (5/2/23)     Cytology:  FNA biopsy of isthmus nodule diagnostic for papillary thyroid carcinoma  Cytology: Tolley VI     Medications:  Vitamin D supplementation: none  Lithium, biotin, amiodarone, iodinated contrast: none  Calcium supplements or calcimetrics: multivitamin     Symptoms:   Thyroid symptoms: \denies overt hyperthyroid or hypothyroid symptoms, she does have palpitations  Parathyroid symptoms: no nephrolithiasis,  muscle/joint pain, abdominal pain/cramping, brain fog, concentration difficulties  Compressive symptoms: has dysphagia and has a GI visit pending    Voice symptoms: no hoarseness, voice changes or increased need to clear the throat.     Surgical risk factors:  Risk of concurrent parathyroid disease low, calcium levels normal  History of neck radiation: none  Prior neck surgery: none  Cardiovascular risk: prior echocardiogram around 2014 with LVEF of 55-60%, PA pressure of 25 mmHg, normal LV diastolic function  Antiplatelet therapy and anticoagulation: none     Bone Health:  DEXA scan not performed     Family history:  No family history of endocrinopathies or endocrine cancers including thyroid diease, thyroid cancer, parathyroid disease, hypercalcemia.        LABORATORY STUDIES:  I personally and independently reviewed relevant lab test results, including the following:           TSH   Date Value Ref Range Status   03/01/2023 2.219 0.400 - 4.000 uIU/mL Final   04/19/2021 1.630 0.400 - 4.000 uIU/mL Final   03/26/2021 2.794 0.400 - 4.000 uIU/mL Final            Free T4   Date Value Ref Range Status   04/19/2021 0.91 0.71 - 1.51 ng/dL Final   08/15/2019 0.90 0.71 - 1.51 ng/dL Final   06/17/2014 0.99 0.71 - 1.51 ng/dL Final              Vit D, 25-Hydroxy   Date Value Ref Range Status   03/26/2021 35 30 - 96 ng/mL Final       Comment:       Vitamin D deficiency.........<10 ng/mL                              Vitamin D insufficiency......10-29 ng/mL       Vitamin D sufficiency........> or equal to 30 ng/mL  Vitamin D toxicity............>100 ng/mL      08/15/2019 37 30 - 96 ng/mL Final       Comment:       Vitamin D deficiency.........<10 ng/mL                              Vitamin D insufficiency......10-29 ng/mL       Vitamin D sufficiency........> or equal to 30 ng/mL  Vitamin D toxicity............>100 ng/mL               Calcium   Date Value Ref Range Status   03/26/2021 8.8 8.7 - 10.5 mg/dL Final   09/04/2019 9.1 8.7  - 10.5 mg/dL Final   08/15/2019 9.0 8.7 - 10.5 mg/dL Final            Magnesium   Date Value Ref Range Status   06/17/2014 2.2 1.6 - 2.6 mg/dL Final            Albumin   Date Value Ref Range Status   03/26/2021 4.0 3.5 - 5.2 g/dL Final   09/04/2019 4.1 3.5 - 5.2 g/dL Final   08/15/2019 4.3 3.5 - 5.2 g/dL Final         PAST MEDICAL HISTORY:      Patient Active Problem List   Diagnosis    Palpitations    Premature ventricular contractions (PVCs) (VPCs)    Weakness    Posture imbalance    Chronic pelvic pain in female    Status post hysteroscopy with polypectomy and D&C     Thyroid nodule    Globus sensation         PAST SURGICAL HISTORY:        Past Surgical History:   Procedure Laterality Date    HYSTEROSCOPY WITH DILATION AND CURETTAGE OF UTERUS N/A 8/28/2019     Procedure: HYSTEROSCOPY, WITH DILATION AND CURETTAGE OF UTERUS;  Surgeon: Maryana Chamberlain MD;  Location: [facility];  Service: OB/GYN;  Laterality: N/A;    TONSILLECTOMY        uterus septate   12/2005    WISDOM TOOTH EXTRACTION             MEDICATIONS:  Current Medications          Current Outpatient Medications   Medication Sig Dispense Refill    acyclovir (ZOVIRAX) 400 MG tablet 1 po TID  For 3-5 days prn fever blisters (Patient not taking: Reported on 5/4/2023) 30 tablet 12    azithromycin (ZITHROMAX Z-J LUIS) 250 MG tablet 2 pills on day 1, then 1 pill a day (Patient not taking: Reported on 5/4/2023) 6 tablet 0    HYDROcodone-acetaminophen (NORCO) 5-325 mg per tablet Take 1 tablet by mouth every 6 (six) hours as needed for Pain. (Patient not taking: Reported on 5/4/2023) 7 tablet 0    ibuprofen (ADVIL ORAL) Take by mouth as needed.        ibuprofen (ADVIL,MOTRIN) 600 MG tablet Take 1 tablet (600 mg total) by mouth 3 (three) times daily. (Patient not taking: Reported on 5/4/2023) 30 tablet 0    miSOPROStoL (CYTOTEC) 200 MCG Tab Take 1 pill po at 7am and 1 pill po at 11am the morning of endometrial biopsy (Patient not taking: Reported on 5/4/2023) 2  "tablet 0    mupirocin (BACTROBAN) 2 % ointment Apply topically 2 (two) times daily. (Patient not taking: Reported on 5/4/2023) 15 g 0    norethindrone-ethinyl estradiol (BLISOVI FE 1/20, 28,) 1 mg-20 mcg (21)/75 mg (7) per tablet Take 1 tablet by mouth once daily. (Patient not taking: Reported on 5/4/2023) 28 tablet 11      No current facility-administered medications for this visit.            ALLERGIES:  Review of patient's allergies indicates:  No Known Allergies     SOCIAL HISTORY:  Social History               Socioeconomic History    Marital status:    Tobacco Use    Smoking status: Never       Passive exposure: Never    Smokeless tobacco: Never   Substance and Sexual Activity    Alcohol use: Not Currently    Drug use: No    Sexual activity: Yes       Partners: Male       Birth control/protection: Condom   Other Topics Concern    Are you pregnant or think you may be? No    Breast-feeding No             FAMILY HISTORY:        Family History   Problem Relation Age of Onset    Melanoma Paternal Grandfather      Breast cancer Paternal Grandmother 80    Melanoma Paternal Grandmother      Melanoma Father      Melanoma Sister      Ovarian cancer Neg Hx      Colon cancer Neg Hx      Cancer Neg Hx            REVIEW OF SYSTEMS:  A detailed review of systems was reviewed with the patient, pertinent positives and negatives are presented in the note and is otherwise negative.     PHYSICAL EXAMINATION:  Vital Signs: BP (!) 90/51 (BP Location: Left arm, Patient Position: Sitting, BP Method: Medium (Automatic))   Pulse 75   Temp 98.6 °F (37 °C)   Ht 5' 5" (1.651 m)   Wt 56.7 kg (124 lb 14.3 oz)   LMP 04/16/2023   SpO2 97%   BMI 20.78 kg/m²      Constitutional: no acute distress, comfortable, well appearing  HENT: no lid lag, no exophthalmos, no scleral icterus, moist mucous membranes, good dentition  Neck: supple, trachea in midline, thyroid is soft and moves well with swallowing, there is a palpable right " isthmus nodule, good neck extension  Heme/Lymph: no cervical or supraclavicular lymphadenopathy  Respiratory: normal respiratory effort, no wheezes or stridor  Cardiovascular: regular rate and rhythm  Extremities: no edema  Skin: warm and dry, no rashes  Neurologic: no gross resting tremor of outstretched hands, voice adequate  Vascular: radial pulses palpable bilaterally  Psychiatric: affect normal     IMAGING STUDIES:  I personally and independently reviewed, visualized and interpreted the images of the below listed radiology studies (including the neck ultrasound and lymph node mapping) and my findings are notable for a right isthmus nodule with indistinct borders along the strap muscle and what appears to be an adjacent or contiguous nodule in the isthmus.  The initial thyroid ultrasound in 2/2023 did report and show an abnormal lymph node but the formal lymph node mapping did not re-demonstrate this lymph node in the left neck.  Reports below for reference.     US Soft Tissue Head Neck Thyroid 02/18/2023     Narrative  EXAMINATION:  US SOFT TISSUE HEAD NECK THYROID     CLINICAL HISTORY:  Other specified symptoms and signs involving the circulatory and respiratory systems     TECHNIQUE:  Ultrasound of the thyroid.     COMPARISON:  None.     FINDINGS:  Right thyroid lobe measures 4.8 x 0.8 x 1.3 cm.  Measured nodules as follows: (1) subcentimeter cystic appearing nodule measuring up to 0.6 cm.     Left thyroid lobe measures 5.0 x 0.8 x 1.2 cm.     Isthmus measures 0.2 cm.  Measured nodules as follows: (2) mixed heterogeneous nodule measuring 0.9 x 0.5 x 0.9 cm; (3) hypoechoic solid-appearing nodule measuring up to 1.1 cm.     No evidence for cervical adenopathy.     Impression  Thyroid containing nodules, none which meet criteria for FNA guided sampling.  Isthmus nodule (above labeled 3) which meets criteria for subsequent follow-up.        Electronically signed by:     Landry Zamudio  Date:                                                 02/19/2023  Time:                                               08:37     US Soft Tissue Head Neck Thyroid 05/02/2023     Narrative  EXAMINATION:  US SOFT TISSUE HEAD NECK THYROID     CLINICAL HISTORY:  lymph node mapping, papillary thyroid cancer; Nontoxic single thyroid nodule     TECHNIQUE:  Ultrasound of the cervical lymph nodes was performed.     COMPARISON:  Ultrasound 02/18/2023     FINDINGS:  Right zone 2 node measuring 2.3 x 0.4 x 1.3 cm.     Left zone 2 node measures 2.0 x 0.5 x 1.0 cm.     2 visualized nodes within the left zone 3 measuring 1.0 x 0.3 x 0.7 and 1.2 x 0.2 x 0.5 cm.     All visualized nodes demonstrate normal fatty hilum and normal cortical thickness.     Mixed cystic and solid nodule within the right lower thyroid measuring 0.6 cm.     Impression  Multiple cervical lymph nodes as above which demonstrate normal morphology     Mixed cystic and solid nodule within the right lower thyroid.  In accordance with ACR guidelines, no follow-up imaging or FNA is recommended.     Electronically signed by resident: Prudencio Tillman  Date:                                                05/02/2023  Time:                                               10:59     Electronically signed by:     Tito Olivas MD  Date:                                                05/02/2023  Time:                                               11:06        CYTOLOGY:          Final Pathologic Diagnosis   Date Value Ref Range Status   04/27/2023 (A)   Final     THYROID, ISTHMUS, FNA:  [facility] Thyroid Cytology Category: Malignant  Papillary thyroid carcinoma.      Other findings and comments:  Blood present.          Comment:       Interp By Elsa Bustos MD, Signed on 05/01/2023 at 12:51            IMPRESSION:  I had the pleasure of seeing Ms. Leal in endocrine surgical consultation regarding her right isthmus nodule with malignant cytology.  I have discussed with Ms. Leal at  length regarding the current surgical and non-surgical treatment options.  Based on the current clinical findings and thorough review of the risks, benefits and alternatives, the patient elects to proceed with a right thyroid lobectomy.     I discussed with the patient the expected perioperative course. A thyroid lobectomy can be performed as an outpatient, if the patient tolerates it well.  The possible complications associated with this may include, but may not be restricted to hoarseness, recurrent laryngeal nerve or superior laryngeal nerve injury - temporary or permanent, neck hematoma, infection, scarring, death and imponderables.  Hypocalcemia would be a risk if the patient would need a total or completion thyroidectomy.  The patient understood that thyroid hormone replacement may be necessary even after a thyroid lobectomy, and definitely after a total or completion thyroidectomy. Thyroid function will need to be monitored.      All questions were answered and the patient expressed understanding of all the risks, benefits and alternatives, and agreed to proceed with the plan despite the risks.  Surgical consent was signed and witnessed.     Problem List Items Addressed This Visit                  Cardiac/Vascular     Palpitations       Euthyroid.  Will check TSH post op to evaluate the need for thyroid hormone replacement.                    Endocrine     Thyroid nodule - Primary       1.1 cm right isthmus nodule with malignant cytology, lymph node mapping negative.  Discussed options for thyroid lobectomy versus upfront total thyroidectomy with associated risks, benefits and alternatives.  Based on a thorough discussion, will proceed to the OR for right thyroid lobectomy and isthmusectomy.              Relevant Orders     CBC Auto Differential     Comprehensive Metabolic Panel     Case Request Operating Room: RIGHT LOBECTOMY, THYROID WITH UNILATERAL NIM NERVE MONITORING (Completed)          Other     Globus  sensation       Unlikely related to the isthmus nodule, discussed this sensation may not improve postoperatively and in fact could become worse, at least initially after surgery.  If symptoms persist, have offered to coordinate GI follow up.               Jerica Cohen MD  [facility] Surgeon  Endocrine Surgery  5/4/23

## 2023-07-25 NOTE — OP NOTE
[facility]  Endocrine Surgery  Operative Report         Date of Procedure: 7/25/2023     Procedure: Procedure(s) (LRB):  RIGHT LOBECTOMY, THYROID AND ISTHMUSECTOMY (Right)     Indications: This patient presents with a 1.1cm papillary thyroid cancer in the thyroid isthmus.     Surgeon(s) and Role:     * Jerica Cohen MD - Primary     * Mitzy Denson MD - Resident - Assisting (PGY-4)    Pre-Operative Diagnosis: Thyroid nodule [E04.1]    Post-Operative Diagnosis: Thyroid nodule [E04.1]    Anesthesia: General    Procedures:   Right thyroid lobectomy and isthmusectomy  Intraoperative monitoring and interpretation of right cranial nerves (vagus and recurrent laryngeal nerves) using the [employer] Mobile system    Intraoperative Findings:   Firm thyroid isthmus nodule without gross extrathyroidal extension, second mobile isthmus nodule.   No suspicious lymphadenopathy in the right central level VI compartment.  The right recurrent laryngeal and vagus nerves were identified and preserved.  Function was verified using the nerve monitoring system.  Right superior and inferior parathyroid glands were identified, marked, and preserved with a viable blood supply.     Description of the Procedure:  The patient was seen in the Holding Room. The risks, benefits, complications, treatment options, and expected outcomes were discussed with the patient.  Possible complications associated with this may include, but may not be restricted to hoarseness, voice changes, recurrent laryngeal nerve or superior laryngeal nerve injury - temporary or permanent, neck hematoma, bleeding, infection, scarring, reaction to medication, pulmonary aspiration, perforation of viscus, the need for additional procedures, failure to diagnose a condition, creating a complication requiring transfusion or operation, death and imponderables.  Hypocalcemia would be a risk if the patient would need a total or completion thyroidectomy.  The patient  understood that thyroid hormone replacement may be necessary even after a thyroid lobectomy, and definitely after a total or completion thyroidectomy. Thyroid function will need to be monitored.  The patient concurred with the proposed plan and agreed to proceed despite the risks, giving informed consent.  The site of surgery properly noted/marked. The patient was taken to Operating Room, identified as Zara Leal and the procedure verified as right thyroid lobectomy and isthmusectomy.    The patient was placed supine after induction of a general anesthetic.  Appropriate lines and access were confirmed by the anesthesia team.  The neck was supported in an extended position and the surgical field was prepped and draped in sterile fashion.  A comprehensive time out was performed.      A 4 cm transverse cervical incision was created below the cricoid cartilage within a natural skin fold.  Dissection was carried down through the platysma layer.  Once this was completed, sub-platysmal flaps were raised superiorly to the thyroid cartilage and inferiorly to the sternal notch. The strap muscles were identified and divided at the midline. Sharp and blunt dissection were used to mobilize the right thyroid lobe in a medial direction.  The thyroid isthmus nodules were palpable and did not demonstrate any gross extra thyroidal extension.  The middle thyroid vein was ligated with a silk tie and divided with the Ligasure.  Dissection continued posterolaterally to expose the tracheoesophageal groove and right carotid artery.  Vagus nerve signal was confirmed with the nerve monitoring system.  The superior pole was carefully dissected free and the superior pole vessels were individually ligated with silk ties and divided with the Ligasure.  The right thyroid lobe was mobilized further, the inferior pole vessels were similarly ligated along the thyroid capsule with silk ties and divided with the Ligasure allowing for  delivery of the thyroid lobe.  During the dissection, a small portion of the thyroid capsule along the superior pole was disrupted.  The right recurrent laryngeal nerve was identified and preserved.  Function was verified using the nerve monitoring system.  The ligament of Briggs was carefully dissected and divided taking care to preserve the recurrent laryngeal nerve.  The superior and inferior parathyroid glands were preserved in situ on a viable vascular pedicle and marked with tiny clips.  The thyroid and isthmus were dissected off the trachea using the Ligasure and bipolar cautery and the thyroid was divided at the margin of the left thyroid lobe and isthmus taking care to incorporate both thyroid isthmus nodules with the specimen. The left thyroid lobe margin was marked with a silk stitch.  The specimen was submitted to pathology for permanent evaluation.  A suture was placed for orientation purposes, short stitch marks superior pole, long stitch marks isthmus margin.  Vagus and recurrent laryngeal nerve function were confirmed with the nerve monitoring system.     The wound was irrigated and inspected carefully.  Multiple Valsalva maneuvers were performed at 30-35 cm of water and additional hemostasis was achieved as necessary with focal application of bipolar cautery.  This was augmented with Fibrillar which was placed in the thyroid fossa.  The superior and inferior parathyroid glands were inspected and confirmed to appear viable.  Function of the recurrent laryngeal and vagus nerves were again verified using the nerve monitoring system prior to closure.  A silk stitch was used to approximate the lower half of the strap muscles at the midline and the strap muscles were further closed with interrupted 3-0 Vicryl suture.  The platysma was closed with interrupted 3-0 Vicryl suture, 0.25% Marcaine was injected into the subcutaneous tissue of the incision for post-op analgesia and the skin incision was closed  with a 6-0 Vicryl subcuticular knot-less closure.  Sterile skin glue was applied to the incision.    Instrument, sponge and needle counts were reported correct prior to closure and at the conclusion of the case.  Procedures and specimens were confirmed with the circulating nurse at the completion of the case.  The family was updated at the completion of the case.    Complications: No    Estimated Blood Loss (EBL): less than 50 mL           Drains: None    Specimens:   Specimen (24h ago, onward)       Start     Ordered    07/25/23 0935  Specimen to Pathology, Surgery General Surgery  Once        Comments: Pre-op Diagnosis: Thyroid nodule [E04.1]Procedure(s):RIGHT THYROID LOBECTOMY AND ISTHMUSECTOMY Number of specimens: 1Name of specimens: 1. Right thyroid lobe and isthmus: short stitch marks superior pole, long stitch marks isthmus- permanent     References:    Click here for ordering Quick Tip   Question Answer Comment   Procedure Type: General Surgery    Specimen Class: Known or suspected malignancy    Which provider would you like to cc? MILES SPARKS    Release to patient Immediate        07/25/23 0944                            Condition: stable    Disposition: PACU - hemodynamically stable.    Attestation: I was present and scrubbed for the entire procedure.

## 2023-07-25 NOTE — PATIENT INSTRUCTIONS
Post-Operative Instructions: Thyroid Lobectomy    PAIN CONTROL AND MEDICATIONS  Chloraseptic lozenges or spray: Use as needed for sore throat  Please take Tylenol for mild pain.  You can take up to 1000mg every 6-8 hours.  Do not exceed 4000mg in 24 hours  You can take the narcotic pain (oxicodone) medication for more severe pain.    You may resume taking your normal medications, with the EXCEPTION of the following:  Please check with your surgeon and internist/cardiologist for specific instructions about when you should re-start:  Apixaban (Eliquis), Clopidogrel (Plavix), Dabigatran (Pradaxa), Dipyridamole (Aggrenox), Rivaroxaban (Xarelto), Warfarin (Coumadin), or any other type of blood thinner you may be taking.  Aspirin & anti-inflammatories (i.e. Goody's, Excedrin, ibuprofen, Advil, Aleve): May begin 72 hours after surgery.  Vitamins, minerals, and herbal supplements: Please wait 1 week after surgery to restart these medications    WOUND CARE  Please use your ice pack for 30 minutes on / 30 minutes off for at least the first 3 days.  You will be discharged from the hospital with your incision covered by skin glue that will remain on until your postoperative appointment.  It is normal to develop a lump under the incision, this is expected and is related to the healing process.  The lump will gradually go away with time.  Ok to shower when you return home after surgery  Please notify your physician if your incision is red, warm/hot, if you have an increase in swelling, any new drainage, or if you have a fever >101.5 F.  Please call 911 or proceed to the Emergency Room if you have difficulty breathing.    ACTIVITIES  You may resume normal activities, depending on your energy level.  Please refrain from driving for at least 3 days, or until you have complete mobility of your neck.    Do not drive if you are taking narcotic pain medication.  Please refrain from heavy lifting (10 lbs.) for 10 days.    If you have any  questions or concerns, please call the surgeon's office at [phone].      Future Appointments   Date Time Provider Department Center   8/10/2023  8:30 AM Jerica Cohen MD [facility]   9/5/2023  1:30 PM LAB, [facility] SHON Gallegos

## 2023-07-25 NOTE — BRIEF OP NOTE
Attila harriet - Surgery ([facility])  Brief Operative Note    Surgery Date: 7/25/2023     Surgeon(s) and Role:     * Miles Cohen MD - Primary     * Mitzy Denson MD - Resident - Assisting        Pre-op Diagnosis:  Thyroid nodule [E04.1]    Post-op Diagnosis:  Post-Op Diagnosis Codes:     * Thyroid nodule [E04.1]    Procedure(s) (LRB):  RIGHT LOBECTOMY, THYROID AND ISTHMUSECTOMY (Right)    Anesthesia: General    Operative Findings: right thyroid lobectomy with NIMs monitoring  Right superior and inferior parathyroid glands identified, marked and preserved    Estimated Blood Loss: * No values recorded between 7/25/2023  7:53 AM and 7/25/2023  9:53 AM *         Specimens:   Specimen (24h ago, onward)       Start     Ordered    07/25/23 0935  Specimen to Pathology, Surgery General Surgery  Once        Comments: Pre-op Diagnosis: Thyroid nodule [E04.1]Procedure(s):RIGHT THYROID LOBECTOMY AND ISTHMUSECTOMY Number of specimens: 1Name of specimens: 1. Right thyroid lobe and isthmus: short stitch marks superior pole, long stitch marks isthmus- permanent     References:    Click here for ordering Quick Tip   Question Answer Comment   Procedure Type: General Surgery    Specimen Class: Known or suspected malignancy    Which provider would you like to cc? MILES COHEN    Release to patient Immediate        07/25/23 0944                      Discharge Note    OUTCOME: Patient tolerated treatment/procedure well without complication and is now ready for discharge.    DISPOSITION: Home or Self Care    FINAL DIAGNOSIS:  Papillary thyroid carcinoma    FOLLOWUP: In clinic    DISCHARGE INSTRUCTIONS:    Discharge Procedure Orders   Lifting restrictions   Order Comments: No lifting objects over 15 lbs for 4-6 weeks     Notify your health care provider if you experience any of the following:  temperature >100.4     Notify your health care provider if you experience any of the following:  persistent nausea and vomiting or diarrhea      Notify your health care provider if you experience any of the following:  severe uncontrolled pain     Notify your health care provider if you experience any of the following:  redness, tenderness, or signs of infection (pain, swelling, redness, odor or green/yellow discharge around incision site)     No dressing needed     Activity as tolerated     Shower on day dressing removed (No bath)   Order Comments: Do not submerge or soak incision. Do not scrub incision. You may allow water and gentle soap to run over the incision while showering.

## 2023-07-25 NOTE — TRANSFER OF CARE
"Anesthesia Transfer of Care Note    Patient: Zara Leal    Procedure(s) Performed: Procedure(s) (LRB):  RIGHT LOBECTOMY, THYROID AND ISTHMUSECTOMY (Right)    Patient location: PACU    Anesthesia Type: general    Transport from OR: Transported from OR on 6-10 L/min O2 by face mask with adequate spontaneous ventilation    Post assessment: no apparent anesthetic complications    Post vital signs: stable    Level of consciousness: sedated    Nausea/Vomiting: no nausea/vomiting    Complications: none    Transfer of care protocol was followed      Last vitals:   Visit Vitals  BP 96/61   Pulse 65   Temp 37.1 °C (98.8 °F) (Oral)   Resp 16   Ht 5' 5" (1.651 m)   Wt 56.7 kg (125 lb)   LMP 07/04/2023 (Exact Date)   SpO2 100%   Breastfeeding No   BMI 20.80 kg/m²     "

## 2023-07-26 NOTE — TELEPHONE ENCOUNTER
Patient states she had thyroid surgery this morning and was prescribed oxycodone. She says she thinks the pain is not that bad and she can take something over the counter. Patient advised that she may take otc extra strength tylenol if she just doesn't want to take her prescription pain med. She states her pain is 6-7/10. Patient advised that she may need to take the oxycodone before pain becomes too severe. Please contact caller directly to discuss any further care advice.    Reason for Disposition   Other post-op symptom or question    Additional Information   Negative: [1] Widespread rash AND [2] bright red, sunburn-like   Negative: [1] SEVERE headache AND [2] after spinal (epidural) anesthesia   Negative: [1] Vomiting AND [2] persists > 4 hours   Negative: [1] Vomiting AND [2] abdomen looks much more swollen than usual   Negative: [1] Drinking very little AND [2] dehydration suspected (e.g., no urine > 12 hours, very dry mouth, very lightheaded)   Negative: Patient sounds very sick or weak to the triager   Negative: Sounds like a serious complication to the triager   Negative: Fever > 100.4 F (38.0 C)   Negative: [1] SEVERE post-op pain (e.g., excruciating, pain scale 8-10) AND [2] not controlled with pain medications   Negative: [1] Caller has URGENT question AND [2] triager unable to answer question   Negative: [1] Headache AND [2] after spinal (epidural) anesthesia AND [3] not severe   Negative: Fever present > 3 days (72 hours)   Negative: [1] MILD-MODERATE post-op pain (e.g., pain scale 1-7) AND [2] not controlled with pain medications   Negative: [1] Caller has NON-URGENT question AND [2] triager unable to answer question    Protocols used: Post-Op Symptoms and [id number]

## 2023-07-31 LAB
FINAL PATHOLOGIC DIAGNOSIS: NORMAL
GROSS: NORMAL
Lab: NORMAL

## 2023-08-02 ENCOUNTER — PATIENT MESSAGE (OUTPATIENT)
Dept: SURGERY | Facility: CLINIC | Age: 48
End: 2023-08-02
Payer: COMMERCIAL

## 2023-08-03 ENCOUNTER — OFFICE VISIT (OUTPATIENT)
Dept: SURGERY | Facility: CLINIC | Age: 48
End: 2023-08-03
Payer: COMMERCIAL

## 2023-08-03 VITALS
WEIGHT: 125.44 LBS | DIASTOLIC BLOOD PRESSURE: 59 MMHG | HEART RATE: 76 BPM | HEIGHT: 65 IN | BODY MASS INDEX: 20.9 KG/M2 | OXYGEN SATURATION: 100 % | SYSTOLIC BLOOD PRESSURE: 91 MMHG

## 2023-08-03 DIAGNOSIS — E04.1 THYROID NODULE: ICD-10-CM

## 2023-08-03 DIAGNOSIS — C73 PAPILLARY THYROID CARCINOMA: ICD-10-CM

## 2023-08-03 PROCEDURE — 3074F PR MOST RECENT SYSTOLIC BLOOD PRESSURE < 130 MM HG: ICD-10-PCS | Mod: CPTII,S$GLB,, | Performed by: STUDENT IN AN ORGANIZED HEALTH CARE EDUCATION/TRAINING PROGRAM

## 2023-08-03 PROCEDURE — 3008F PR BODY MASS INDEX (BMI) DOCUMENTED: ICD-10-PCS | Mod: CPTII,S$GLB,, | Performed by: STUDENT IN AN ORGANIZED HEALTH CARE EDUCATION/TRAINING PROGRAM

## 2023-08-03 PROCEDURE — 99024 PR POST-OP FOLLOW-UP VISIT: ICD-10-PCS | Mod: S$GLB,,, | Performed by: STUDENT IN AN ORGANIZED HEALTH CARE EDUCATION/TRAINING PROGRAM

## 2023-08-03 PROCEDURE — 3078F DIAST BP <80 MM HG: CPT | Mod: CPTII,S$GLB,, | Performed by: STUDENT IN AN ORGANIZED HEALTH CARE EDUCATION/TRAINING PROGRAM

## 2023-08-03 PROCEDURE — 99999 PR PBB SHADOW E&M-EST. PATIENT-LVL III: CPT | Mod: PBBFAC,,, | Performed by: STUDENT IN AN ORGANIZED HEALTH CARE EDUCATION/TRAINING PROGRAM

## 2023-08-03 PROCEDURE — 1159F MED LIST DOCD IN RCRD: CPT | Mod: CPTII,S$GLB,, | Performed by: STUDENT IN AN ORGANIZED HEALTH CARE EDUCATION/TRAINING PROGRAM

## 2023-08-03 PROCEDURE — 3078F PR MOST RECENT DIASTOLIC BLOOD PRESSURE < 80 MM HG: ICD-10-PCS | Mod: CPTII,S$GLB,, | Performed by: STUDENT IN AN ORGANIZED HEALTH CARE EDUCATION/TRAINING PROGRAM

## 2023-08-03 PROCEDURE — 99024 POSTOP FOLLOW-UP VISIT: CPT | Mod: S$GLB,,, | Performed by: STUDENT IN AN ORGANIZED HEALTH CARE EDUCATION/TRAINING PROGRAM

## 2023-08-03 PROCEDURE — 3074F SYST BP LT 130 MM HG: CPT | Mod: CPTII,S$GLB,, | Performed by: STUDENT IN AN ORGANIZED HEALTH CARE EDUCATION/TRAINING PROGRAM

## 2023-08-03 PROCEDURE — 99999 PR PBB SHADOW E&M-EST. PATIENT-LVL III: ICD-10-PCS | Mod: PBBFAC,,, | Performed by: STUDENT IN AN ORGANIZED HEALTH CARE EDUCATION/TRAINING PROGRAM

## 2023-08-03 PROCEDURE — 1159F PR MEDICATION LIST DOCUMENTED IN MEDICAL RECORD: ICD-10-PCS | Mod: CPTII,S$GLB,, | Performed by: STUDENT IN AN ORGANIZED HEALTH CARE EDUCATION/TRAINING PROGRAM

## 2023-08-03 PROCEDURE — 3008F BODY MASS INDEX DOCD: CPT | Mod: CPTII,S$GLB,, | Performed by: STUDENT IN AN ORGANIZED HEALTH CARE EDUCATION/TRAINING PROGRAM

## 2023-08-03 NOTE — PROGRESS NOTES
"Postoperative Endocrine Surgery Clinic Note    Reason for visit / Chief complaint: Postoperative evaluation  Procedure:  Right Lobectomy, Thyroid And Isthmusectomy - Right  Procedure Date: 7/25/2023      Subjective:     Zara Leal returns today for postoperative evaluation, she is approximately 1 week post op.    Procedures:   Right thyroid lobectomy and isthmusectomy  Intraoperative monitoring and interpretation of right cranial nerves (vagus and recurrent laryngeal nerves) using the [employer] (?????) system     Intraoperative Findings:   Firm thyroid isthmus nodule without gross extrathyroidal extension, second mobile isthmus nodule.   No suspicious lymphadenopathy in the right central level VI compartment.  The right recurrent laryngeal and vagus nerves were identified and preserved.  Function was verified using the nerve monitoring system.  Right superior and inferior parathyroid glands were identified, marked, and preserved with a viable blood supply.     She has experienced a rash of her chin and upper chest, suspect from the adhesives or surgical presp. Her phonation is at baseline.  She denies hyperthyroid or hypothyroid symptoms though has been quite fatigued and has had hair loss.  Pain is well controlled.    Current Outpatient Medications   Medication Sig Dispense Refill    acyclovir (ZOVIRAX) 400 MG tablet 1 po TID  For 3-5 days prn fever blisters 30 tablet 11    norethindrone-ethinyl estradiol (BLISOVI FE 1/20, 28,) 1 mg-20 mcg (21)/75 mg (7) per tablet Take 1 tablet by mouth once daily. 30 tablet 7     No current facility-administered medications for this visit.     Review of patient's allergies indicates:  No Known Allergies     Review of Systems  Negative except as per HPI.     Objective:   BP (!) 91/59   Pulse 76   Ht 5' 5" (1.651 m)   Wt 56.9 kg (125 lb 7.1 oz)   LMP 07/04/2023 (Exact Date)   SpO2 100%   BMI 20.87 kg/m²     General: alert, well appearing, and in no distress  Neck: " neck is flat, no erythema or edema  Incision: well approximated, healing well  Neurological: phonation is normal    Pathology  Final Pathologic Diagnosis   Date Value Ref Range Status   07/25/2023   Final    Thyroid, right lobe and isthmus, hemithyroidectomy:  - Papillary thyroid carcinoma, conventional type   - Tumor measures 1.3 cm in maximal dimension   - Tumor is confined to the thyroid   - Margins of resection are negative for malignancy   - Please see SYNOPTIC REPORT below    SYNOPTIC REPORT   SPECIMEN, LATERALITY, PROCEDURE:  Thyroid and isthmus, right, hemithyroidectomy  TUMOR FOCALITY:  Single focus  HISTOLOGIC TUMOR TYPES AND SUBTYPES:  Papillary thyroid carcinoma, conventional type  TUMOR SITE:  Right lobe and isthmus  TUMOR SIZE:  1.3 cm  TUMOR PROLIFERATIVE ACTIVITY:  < 3 mitoses per 2 mm^2  TUMOR NECROSIS:  Not identified  MARGINS:  Uninvolved by tumor  VASCULAR INVASION:  Not identified  LYMPHATIC INVASION:  Not identified  EXTRATHYROIDAL EXTENSION:  Not identified  REGIONAL LYMPH NODES:  None submitted or identified within specimen  ADDITIONAL FINDINGS:  Multinodular hyperplasia  PATHOLOGIC STAGE: pT1b pNX       Comment:     Interp By BORIS Restrepo MD, Signed on 07/31/2023 at 11:01       Assessment and Plan:     Problem List Items Addressed This Visit          Oncology    Papillary thyroid carcinoma    Current Assessment & Plan     Right thyroid lobectomy and isthmusectomy on 7/25/2023.  Doing well from a postoperative standpoint.  Final diagnosis demonstrates 1.3 cm PTC without vascular invasion or other aggressive features.  I have discussed in detail the meaning of this thyroid cancer diagnosis.      - The pathology has been discussed with her endocrinologist (Dr. English) and will plan for TSH and Tg check at 6 weeks post op  - Discontinue narcotics  - Incision care discussed, scar massage and routine scar care information provided            Endocrine    Thyroid nodule    Current Assessment  & Plan     S/p right thyroid lobectomy/isthmusectomy.  See above.          Jerica Cohen MD  Staff Surgeon  Endocrine Surgery  8/3/23

## 2023-08-03 NOTE — ASSESSMENT & PLAN NOTE
Right thyroid lobectomy and isthmusectomy on 7/25/2023.  Doing well from a postoperative standpoint.  Final diagnosis demonstrates 1.3 cm PTC without vascular invasion or other aggressive features.  I have discussed in detail the meaning of this thyroid cancer diagnosis.      - The pathology has been discussed with her endocrinologist (Dr. English) and will plan for TSH and Tg check at 6 weeks post op  - Discontinue narcotics  - Incision care discussed, scar massage and routine scar care information provided

## 2023-08-08 ENCOUNTER — TELEPHONE (OUTPATIENT)
Dept: SURGERY | Facility: CLINIC | Age: 48
End: 2023-08-08
Payer: COMMERCIAL

## 2023-08-10 ENCOUNTER — OFFICE VISIT (OUTPATIENT)
Dept: SURGERY | Facility: CLINIC | Age: 48
End: 2023-08-10
Payer: COMMERCIAL

## 2023-08-10 VITALS
SYSTOLIC BLOOD PRESSURE: 93 MMHG | WEIGHT: 127.13 LBS | HEIGHT: 65 IN | HEART RATE: 68 BPM | DIASTOLIC BLOOD PRESSURE: 57 MMHG | OXYGEN SATURATION: 99 % | BODY MASS INDEX: 21.18 KG/M2

## 2023-08-10 DIAGNOSIS — C73 PAPILLARY THYROID CARCINOMA: ICD-10-CM

## 2023-08-10 PROCEDURE — 3008F BODY MASS INDEX DOCD: CPT | Mod: CPTII,S$GLB,, | Performed by: STUDENT IN AN ORGANIZED HEALTH CARE EDUCATION/TRAINING PROGRAM

## 2023-08-10 PROCEDURE — 3008F PR BODY MASS INDEX (BMI) DOCUMENTED: ICD-10-PCS | Mod: CPTII,S$GLB,, | Performed by: STUDENT IN AN ORGANIZED HEALTH CARE EDUCATION/TRAINING PROGRAM

## 2023-08-10 PROCEDURE — 3078F DIAST BP <80 MM HG: CPT | Mod: CPTII,S$GLB,, | Performed by: STUDENT IN AN ORGANIZED HEALTH CARE EDUCATION/TRAINING PROGRAM

## 2023-08-10 PROCEDURE — 3074F SYST BP LT 130 MM HG: CPT | Mod: CPTII,S$GLB,, | Performed by: STUDENT IN AN ORGANIZED HEALTH CARE EDUCATION/TRAINING PROGRAM

## 2023-08-10 PROCEDURE — 99999 PR PBB SHADOW E&M-EST. PATIENT-LVL III: CPT | Mod: PBBFAC,,, | Performed by: STUDENT IN AN ORGANIZED HEALTH CARE EDUCATION/TRAINING PROGRAM

## 2023-08-10 PROCEDURE — 3074F PR MOST RECENT SYSTOLIC BLOOD PRESSURE < 130 MM HG: ICD-10-PCS | Mod: CPTII,S$GLB,, | Performed by: STUDENT IN AN ORGANIZED HEALTH CARE EDUCATION/TRAINING PROGRAM

## 2023-08-10 PROCEDURE — 99024 PR POST-OP FOLLOW-UP VISIT: ICD-10-PCS | Mod: S$GLB,,, | Performed by: STUDENT IN AN ORGANIZED HEALTH CARE EDUCATION/TRAINING PROGRAM

## 2023-08-10 PROCEDURE — 99999 PR PBB SHADOW E&M-EST. PATIENT-LVL III: ICD-10-PCS | Mod: PBBFAC,,, | Performed by: STUDENT IN AN ORGANIZED HEALTH CARE EDUCATION/TRAINING PROGRAM

## 2023-08-10 PROCEDURE — 3078F PR MOST RECENT DIASTOLIC BLOOD PRESSURE < 80 MM HG: ICD-10-PCS | Mod: CPTII,S$GLB,, | Performed by: STUDENT IN AN ORGANIZED HEALTH CARE EDUCATION/TRAINING PROGRAM

## 2023-08-10 PROCEDURE — 99024 POSTOP FOLLOW-UP VISIT: CPT | Mod: S$GLB,,, | Performed by: STUDENT IN AN ORGANIZED HEALTH CARE EDUCATION/TRAINING PROGRAM

## 2023-08-10 NOTE — PROGRESS NOTES
Postoperative Endocrine Surgery Clinic Note    Reason for visit / Chief complaint: Postoperative evaluation  Procedure:  Right Lobectomy, Thyroid And Isthmusectomy - Right  Procedure Date: 7/25/2023      Subjective:     Zara Leal returns today for postoperative evaluation, she is approximately 2 weeks post op.  Last seen about 1 week post op.    Procedures:   Right thyroid lobectomy and isthmusectomy  Intraoperative monitoring and interpretation of right cranial nerves (vagus and recurrent laryngeal nerves) using the [employer] system     Intraoperative Findings:   Firm thyroid isthmus nodule without gross extrathyroidal extension, second mobile isthmus nodule.   No suspicious lymphadenopathy in the right central level VI compartment.  The right recurrent laryngeal and vagus nerves were identified and preserved.  Function was verified using the nerve monitoring system.  Right superior and inferior parathyroid glands were identified, marked, and preserved with a viable blood supply.     She has experienced a rash of her chin and upper chest, suspect from the adhesives or surgical presp. Her phonation is at baseline.  She denies hyperthyroid or hypothyroid symptoms though has been quite fatigued and has had hair loss.  Pain is well controlled.    Interval History 8/10/2023  Patient returns to clinic today for post operative follow up. She is doing well. She had additional questions about her incision, diagnosis, and ongoing plan.  Having some globus sensation.     Current Outpatient Medications   Medication Sig Dispense Refill    acyclovir (ZOVIRAX) 400 MG tablet 1 po TID  For 3-5 days prn fever blisters 30 tablet 11    norethindrone-ethinyl estradiol (BLISOVI FE 1/20, 28,) 1 mg-20 mcg (21)/75 mg (7) per tablet Take 1 tablet by mouth once daily. 30 tablet 7     No current facility-administered medications for this visit.     Review of patient's allergies indicates:  No Known Allergies     Review of  "Systems  Negative except as per HPI.     Objective:   BP (!) 93/57   Pulse 68   Ht 5' 5" (1.651 m)   Wt 57.6 kg (127 lb 1.5 oz)   LMP 07/04/2023 (Exact Date)   SpO2 99%   BMI 21.15 kg/m²     General: alert, well appearing, and in no distress  Neck: neck is flat, no erythema or edema  Incision: well approximated, healing well  Neurological: phonation is normal    Pathology  Final Pathologic Diagnosis   Date Value Ref Range Status   07/25/2023   Final    Thyroid, right lobe and isthmus, hemithyroidectomy:  - Papillary thyroid carcinoma, conventional type   - Tumor measures 1.3 cm in maximal dimension   - Tumor is confined to the thyroid   - Margins of resection are negative for malignancy   - Please see SYNOPTIC REPORT below    SYNOPTIC REPORT   SPECIMEN, LATERALITY, PROCEDURE:  Thyroid and isthmus, right, hemithyroidectomy  TUMOR FOCALITY:  Single focus  HISTOLOGIC TUMOR TYPES AND SUBTYPES:  Papillary thyroid carcinoma, conventional type  TUMOR SITE:  Right lobe and isthmus  TUMOR SIZE:  1.3 cm  TUMOR PROLIFERATIVE ACTIVITY:  < 3 mitoses per 2 mm^2  TUMOR NECROSIS:  Not identified  MARGINS:  Uninvolved by tumor  VASCULAR INVASION:  Not identified  LYMPHATIC INVASION:  Not identified  EXTRATHYROIDAL EXTENSION:  Not identified  REGIONAL LYMPH NODES:  None submitted or identified within specimen  ADDITIONAL FINDINGS:  Multinodular hyperplasia  PATHOLOGIC STAGE: pT1b pNX       Comment:     Interp By BORIS Restrepo MD, Signed on 07/31/2023 at 11:01       Assessment and Plan:     Problem List Items Addressed This Visit          Oncology    Papillary thyroid carcinoma    Current Assessment & Plan     Right thyroid lobectomy and isthmusectomy on 7/25/2023.  Doing well from a postoperative standpoint.  Final diagnosis demonstrates 1.3 cm PTC without vascular invasion or other aggressive features.  I have discussed in detail the meaning of this thyroid cancer diagnosis. All questions were answered.     - The " pathology will be discussed with her endocrinologist (Dr. English) who has ordered appropriate follow up labs  - Recommended taking daily calcium via dietary sources or supplementation equal to about 1200 mg daily and vitamin D3 supplementation,equal to 1000 - 2000 IU a day  - Incision care discussed, scar massage and routine scar care information provided  - Follow up MATEO Cohen MD  Staff Surgeon  Endocrine Surgery  8/10/23

## 2023-08-10 NOTE — ASSESSMENT & PLAN NOTE
Right thyroid lobectomy and isthmusectomy on 7/25/2023.  Doing well from a postoperative standpoint.  Final diagnosis demonstrates 1.3 cm PTC without vascular invasion or other aggressive features.  I have discussed in detail the meaning of this thyroid cancer diagnosis. All questions were answered.     - The pathology will be discussed with her endocrinologist (Dr. English) who has ordered appropriate follow up labs  - Recommended taking daily calcium via dietary sources or supplementation equal to about 1200 mg daily and vitamin D3 supplementation,equal to 1000 - 2000 IU a day  - Incision care discussed, scar massage and routine scar care information provided  - Follow up PRN

## 2023-09-05 ENCOUNTER — PATIENT MESSAGE (OUTPATIENT)
Dept: ENDOCRINOLOGY | Facility: CLINIC | Age: 48
End: 2023-09-05
Payer: COMMERCIAL

## 2023-09-05 ENCOUNTER — LAB VISIT (OUTPATIENT)
Dept: LAB | Facility: HOSPITAL | Age: 48
End: 2023-09-05
Attending: INTERNAL MEDICINE
Payer: COMMERCIAL

## 2023-09-05 DIAGNOSIS — E04.1 THYROID NODULE: Primary | ICD-10-CM

## 2023-09-05 DIAGNOSIS — E04.1 THYROID NODULE: ICD-10-CM

## 2023-09-05 LAB
T4 FREE SERPL-MCNC: 0.8 NG/DL (ref 0.71–1.51)
TSH SERPL DL<=0.005 MIU/L-ACNC: 6.91 UIU/ML (ref 0.4–4)

## 2023-09-05 PROCEDURE — 36415 COLL VENOUS BLD VENIPUNCTURE: CPT | Performed by: INTERNAL MEDICINE

## 2023-09-05 PROCEDURE — 86800 THYROGLOBULIN ANTIBODY: CPT | Performed by: INTERNAL MEDICINE

## 2023-09-05 PROCEDURE — 84443 ASSAY THYROID STIM HORMONE: CPT | Performed by: INTERNAL MEDICINE

## 2023-09-05 PROCEDURE — 84439 ASSAY OF FREE THYROXINE: CPT | Performed by: INTERNAL MEDICINE

## 2023-09-05 RX ORDER — LEVOTHYROXINE SODIUM 50 UG/1
50 TABLET ORAL
Qty: 30 TABLET | Refills: 11 | Status: SHIPPED | OUTPATIENT
Start: 2023-09-05 | End: 2024-09-04

## 2023-09-06 LAB
THRYOGLOBULIN INTERPRETATION: ABNORMAL
THYROGLOB AB SERPL-ACNC: <1.8 IU/ML
THYROGLOB SERPL-MCNC: 30 NG/ML

## 2023-09-22 ENCOUNTER — OFFICE VISIT (OUTPATIENT)
Dept: ENDOCRINOLOGY | Facility: CLINIC | Age: 48
End: 2023-09-22
Payer: COMMERCIAL

## 2023-09-22 DIAGNOSIS — E03.9 HYPOTHYROIDISM, UNSPECIFIED TYPE: ICD-10-CM

## 2023-09-22 DIAGNOSIS — R09.A2 GLOBUS SENSATION: ICD-10-CM

## 2023-09-22 DIAGNOSIS — C73 PAPILLARY THYROID CARCINOMA: Primary | ICD-10-CM

## 2023-09-22 PROBLEM — E04.1 THYROID NODULE: Status: RESOLVED | Noted: 2023-03-01 | Resolved: 2023-09-22

## 2023-09-22 PROCEDURE — 1160F RVW MEDS BY RX/DR IN RCRD: CPT | Mod: CPTII,95,, | Performed by: INTERNAL MEDICINE

## 2023-09-22 PROCEDURE — 99214 PR OFFICE/OUTPT VISIT, EST, LEVL IV, 30-39 MIN: ICD-10-PCS | Mod: 95,,, | Performed by: INTERNAL MEDICINE

## 2023-09-22 PROCEDURE — 1159F PR MEDICATION LIST DOCUMENTED IN MEDICAL RECORD: ICD-10-PCS | Mod: CPTII,95,, | Performed by: INTERNAL MEDICINE

## 2023-09-22 PROCEDURE — 1160F PR REVIEW ALL MEDS BY PRESCRIBER/CLIN PHARMACIST DOCUMENTED: ICD-10-PCS | Mod: CPTII,95,, | Performed by: INTERNAL MEDICINE

## 2023-09-22 PROCEDURE — 99214 OFFICE O/P EST MOD 30 MIN: CPT | Mod: 95,,, | Performed by: INTERNAL MEDICINE

## 2023-09-22 PROCEDURE — 1159F MED LIST DOCD IN RCRD: CPT | Mod: CPTII,95,, | Performed by: INTERNAL MEDICINE

## 2023-09-22 NOTE — ASSESSMENT & PLAN NOTE
Check TSH as scheduled  Goal low normal range  Reviewed need to repeat with starting/stopping estrogen if she does this in future

## 2023-09-22 NOTE — PROGRESS NOTES
Zara Leal is a 48 y.o. female presenting for follow-up of PTC    The patient location is: home in [place]  The chief complaint leading to consultation is:   Chief Complaint   Patient presents with    Thyroid Cancer    Hypothyroidism       Visit type: audiovisual    Face to Face time with patient: 18 minutes  25 minutes of total time spent on the encounter, which includes face to face time and non-face to face time preparing to see the patient (eg, review of tests), Obtaining and/or reviewing separately obtained history, Documenting clinical information in the electronic or other health record, Independently interpreting results (not separately reported) and communicating results to the patient/family/caregiver, or Care coordination (not separately reported).    Each patient to whom he or she provides medical services by telemedicine is:  (1) informed of the relationship between the physician and patient and the respective role of any other health care provider with respect to management of the patient; and (2) notified that he or she may decline to receive medical services by telemedicine and may withdraw from such care at any time.      History of Present Illness  PTC  Seen initially for globus sensation  FNA of isthmus nodule with PTC    Underwent hemithyroidectomy, isthmusectomy with Dr. Cohen 7.2023  Pathology:  Thyroid, right lobe and isthmus, hemithyroidectomy:   - Papillary thyroid carcinoma, conventional type   - Tumor measures 1.3 cm in maximal dimension   - Tumor is confined to the thyroid   - Margins of resection are negative for malignancy   - Please see SYNOPTIC REPORT below     SYNOPTIC REPORT   SPECIMEN, LATERALITY, PROCEDURE:  Thyroid and isthmus, right, hemithyroidectomy   TUMOR FOCALITY:  Single focus   HISTOLOGIC TUMOR TYPES AND SUBTYPES:  Papillary thyroid carcinoma, conventional type   TUMOR SITE:  Right lobe and isthmus   TUMOR SIZE:  1.3 cm   TUMOR PROLIFERATIVE ACTIVITY:  < 3  mitoses per 2 mm^2   TUMOR NECROSIS:  Not identified   MARGINS:  Uninvolved by tumor   VASCULAR INVASION:  Not identified   LYMPHATIC INVASION:  Not identified   EXTRATHYROIDAL EXTENSION:  Not identified   REGIONAL LYMPH NODES:  None submitted or identified within specimen   ADDITIONAL FINDINGS:  Multinodular hyperplasia   PATHOLOGIC STAGE: pT1b pNX     No high risk features so did not have completion thyroidectomy    Presents today for follow-up    Some more fatigue and hair loss  Globus sensation unchanged, some pill sticking still    In past on OCP for heavy painful cycles but not taking currently and cycles have been lighter and easier. Asking about impact of thyroid on menopause      Post-operative hypothyroidism  TSH after surgery 6  Started on LT4 50 mcg daily  Has not yet had repeat lab on therapy  Lab Results   Component Value Date    TSH 6.905 (H) 09/05/2023           Current Outpatient Medications:     acyclovir (ZOVIRAX) 400 MG tablet, 1 po TID  For 3-5 days prn fever blisters, Disp: 30 tablet, Rfl: 11    levothyroxine (SYNTHROID) 50 MCG tablet, Take 1 tablet (50 mcg total) by mouth before breakfast., Disp: 30 tablet, Rfl: 11    norethindrone-ethinyl estradiol (BLISOVI FE 1/20, 28,) 1 mg-20 mcg (21)/75 mg (7) per tablet, Take 1 tablet by mouth once daily., Disp: 30 tablet, Rfl: 7    ROS as above    Objective:     There were no vitals filed for this visit.    Wt Readings from Last 3 Encounters:   08/10/23 57.6 kg (127 lb 1.5 oz)   08/03/23 56.9 kg (125 lb 7.1 oz)   07/25/23 56.7 kg (125 lb)     There is no height or weight on file to calculate BMI.      Labs    Chemistry        Component Value Date/Time     07/17/2023 1300    K 4.2 07/17/2023 1300     07/17/2023 1300    CO2 26 07/17/2023 1300    BUN 8 07/17/2023 1300    CREATININE 0.7 07/17/2023 1300     07/17/2023 1300        Component Value Date/Time    CALCIUM 8.9 07/17/2023 1300    ALKPHOS 43 (L) 07/17/2023 1300    AST 19 07/17/2023  1300    ALT 20 07/17/2023 1300    BILITOT 0.6 07/17/2023 1300    ESTGFRAFRICA >60.0 03/26/2021 1410    EGFRNONAA >60.0 03/26/2021 1410              Assessment and Plan     Papillary thyroid carcinoma  Low risk for recurrence. No indication for completion thyroidectomy or PONCE  Will check US in 6 months as baseline and then likely space to annually  Can follow thyroglobulin although discussed that this will be marked as elevated and vary with time to some degree    Hypothyroidism  Check TSH as scheduled  Goal low normal range  Reviewed need to repeat with starting/stopping estrogen if she does this in future    Globus sensation  Would not expect to be related to thyroid at this point  Consider swallow study as has been planned in past          RTC 6 months with US and TSH        Jacqueline English MD

## 2023-09-22 NOTE — ASSESSMENT & PLAN NOTE
Would not expect to be related to thyroid at this point  Consider swallow study as has been planned in past

## 2023-09-22 NOTE — ASSESSMENT & PLAN NOTE
Low risk for recurrence. No indication for completion thyroidectomy or PONCE  Will check US in 6 months as baseline and then likely space to annually  Can follow thyroglobulin although discussed that this will be marked as elevated and vary with time to some degree

## 2023-10-17 ENCOUNTER — PATIENT MESSAGE (OUTPATIENT)
Dept: ENDOCRINOLOGY | Facility: CLINIC | Age: 48
End: 2023-10-17
Payer: COMMERCIAL

## 2023-10-17 ENCOUNTER — LAB VISIT (OUTPATIENT)
Dept: LAB | Facility: HOSPITAL | Age: 48
End: 2023-10-17
Attending: INTERNAL MEDICINE
Payer: COMMERCIAL

## 2023-10-17 DIAGNOSIS — E03.9 HYPOTHYROIDISM, UNSPECIFIED TYPE: ICD-10-CM

## 2023-10-17 DIAGNOSIS — E04.1 THYROID NODULE: ICD-10-CM

## 2023-10-17 DIAGNOSIS — C73 PAPILLARY THYROID CARCINOMA: Primary | ICD-10-CM

## 2023-10-17 LAB — TSH SERPL DL<=0.005 MIU/L-ACNC: 1.83 UIU/ML (ref 0.4–4)

## 2023-10-17 PROCEDURE — 84443 ASSAY THYROID STIM HORMONE: CPT | Performed by: INTERNAL MEDICINE

## 2023-10-17 PROCEDURE — 36415 COLL VENOUS BLD VENIPUNCTURE: CPT | Performed by: INTERNAL MEDICINE

## 2023-11-21 ENCOUNTER — TELEPHONE (OUTPATIENT)
Dept: FAMILY MEDICINE | Facility: CLINIC | Age: 48
End: 2023-11-21
Payer: COMMERCIAL

## 2023-11-21 RX ORDER — DOXYCYCLINE 100 MG/1
100 CAPSULE ORAL 2 TIMES DAILY
Qty: 20 CAPSULE | Refills: 0 | Status: SHIPPED | OUTPATIENT
Start: 2023-11-21

## 2023-12-15 ENCOUNTER — TELEPHONE (OUTPATIENT)
Dept: ENDOCRINOLOGY | Facility: CLINIC | Age: 48
End: 2023-12-15
Payer: COMMERCIAL

## 2023-12-15 NOTE — TELEPHONE ENCOUNTER
----- Message from Maureen Pardo sent at 12/15/2023  9:28 AM CST -----  Regarding: question  Contact: @ [phone]  Pt called in regards to asking a question about a previous surgery to remove a part of her thyroid and she is feeling a little bump by her the  incision and needs to know if she should be seen for that .....Please call and adv @ [phone]

## 2024-01-04 ENCOUNTER — OFFICE VISIT (OUTPATIENT)
Dept: SURGERY | Facility: CLINIC | Age: 49
End: 2024-01-04
Payer: COMMERCIAL

## 2024-01-04 VITALS
BODY MASS INDEX: 22.08 KG/M2 | HEIGHT: 65 IN | WEIGHT: 132.5 LBS | SYSTOLIC BLOOD PRESSURE: 89 MMHG | HEART RATE: 69 BPM | DIASTOLIC BLOOD PRESSURE: 52 MMHG | OXYGEN SATURATION: 100 %

## 2024-01-04 DIAGNOSIS — C73 PAPILLARY THYROID CARCINOMA: Primary | ICD-10-CM

## 2024-01-04 PROCEDURE — 3078F DIAST BP <80 MM HG: CPT | Mod: CPTII,S$GLB,, | Performed by: STUDENT IN AN ORGANIZED HEALTH CARE EDUCATION/TRAINING PROGRAM

## 2024-01-04 PROCEDURE — 99212 OFFICE O/P EST SF 10 MIN: CPT | Mod: S$GLB,,, | Performed by: STUDENT IN AN ORGANIZED HEALTH CARE EDUCATION/TRAINING PROGRAM

## 2024-01-04 PROCEDURE — 99999 PR PBB SHADOW E&M-EST. PATIENT-LVL III: CPT | Mod: PBBFAC,,, | Performed by: STUDENT IN AN ORGANIZED HEALTH CARE EDUCATION/TRAINING PROGRAM

## 2024-01-04 PROCEDURE — 1159F MED LIST DOCD IN RCRD: CPT | Mod: CPTII,S$GLB,, | Performed by: STUDENT IN AN ORGANIZED HEALTH CARE EDUCATION/TRAINING PROGRAM

## 2024-01-04 PROCEDURE — 3008F BODY MASS INDEX DOCD: CPT | Mod: CPTII,S$GLB,, | Performed by: STUDENT IN AN ORGANIZED HEALTH CARE EDUCATION/TRAINING PROGRAM

## 2024-01-04 PROCEDURE — 3074F SYST BP LT 130 MM HG: CPT | Mod: CPTII,S$GLB,, | Performed by: STUDENT IN AN ORGANIZED HEALTH CARE EDUCATION/TRAINING PROGRAM

## 2024-01-04 NOTE — PROGRESS NOTES
Postoperative Endocrine Surgery Clinic Note    Reason for visit / Chief complaint: Postoperative evaluation  Procedure:  Right Lobectomy, Thyroid And Isthmusectomy - Right  Procedure Date: 7/25/2023      Subjective:     Zara Leal returns today for postoperative evaluation, she is approximately 6 months post op.  Last seen in August of 2023.    Procedures:   Right thyroid lobectomy and isthmusectomy  Intraoperative monitoring and interpretation of right cranial nerves (vagus and recurrent laryngeal nerves) using the VideoJax system     Intraoperative Findings:   Firm thyroid isthmus nodule without gross extrathyroidal extension, second mobile isthmus nodule.   No suspicious lymphadenopathy in the right central level VI compartment.  The right recurrent laryngeal and vagus nerves were identified and preserved.  Function was verified using the nerve monitoring system.  Right superior and inferior parathyroid glands were identified, marked, and preserved with a viable blood supply.     She has experienced a rash of her chin and upper chest, suspect from the adhesives or surgical presp. Her phonation is at baseline.  She denies hyperthyroid or hypothyroid symptoms though has been quite fatigued and has had hair loss.  Pain is well controlled.    Interval History 8/10/2023  Patient returns to clinic today for post operative follow up. She is doing well. She had additional questions about her incision, diagnosis, and ongoing plan.  Having some globus sensation.     Interval History 1/4/2024  Patient comes in today as she feels a new lump near her surgical site.  States that it is a small bump that she noticed around 3-4 weeks ago.  It is not causing any patient and does not notice any skin changes around it.  Otherwise is doing well.    Current Outpatient Medications   Medication Sig Dispense Refill    acyclovir (ZOVIRAX) 400 MG tablet 1 po TID  For 3-5 days prn fever blisters 30 tablet 11     "doxycycline (MONODOX) 100 MG capsule Take 1 capsule (100 mg total) by mouth 2 (two) times daily. 20 capsule 0    levothyroxine (SYNTHROID) 50 MCG tablet Take 1 tablet (50 mcg total) by mouth before breakfast. 30 tablet 11    norethindrone-ethinyl estradiol (BLISOVI FE 1/20, 28,) 1 mg-20 mcg (21)/75 mg (7) per tablet Take 1 tablet by mouth once daily. 30 tablet 7     No current facility-administered medications for this visit.     Review of patient's allergies indicates:  No Known Allergies     Review of Systems  Negative except as per HPI.     Objective:   BP (!) 89/52   Pulse 69   Ht 5' 5" (1.651 m)   Wt 60.1 kg (132 lb 7.9 oz)   LMP 01/02/2024   SpO2 100%   BMI 22.05 kg/m²     General: alert, well appearing, and in no distress  Neck: neck is flat, no erythema or edema  Incision: well approximated, healed well, palpable small bump that is mobile, nontender, no overlying skin changes, feels consistent with a stitch knot  Neurological: phonation is normal    Pathology  Final Pathologic Diagnosis   Date Value Ref Range Status   07/25/2023   Final    Thyroid, right lobe and isthmus, hemithyroidectomy:  - Papillary thyroid carcinoma, conventional type   - Tumor measures 1.3 cm in maximal dimension   - Tumor is confined to the thyroid   - Margins of resection are negative for malignancy   - Please see SYNOPTIC REPORT below    SYNOPTIC REPORT   SPECIMEN, LATERALITY, PROCEDURE:  Thyroid and isthmus, right, hemithyroidectomy  TUMOR FOCALITY:  Single focus  HISTOLOGIC TUMOR TYPES AND SUBTYPES:  Papillary thyroid carcinoma, conventional type  TUMOR SITE:  Right lobe and isthmus  TUMOR SIZE:  1.3 cm  TUMOR PROLIFERATIVE ACTIVITY:  < 3 mitoses per 2 mm^2  TUMOR NECROSIS:  Not identified  MARGINS:  Uninvolved by tumor  VASCULAR INVASION:  Not identified  LYMPHATIC INVASION:  Not identified  EXTRATHYROIDAL EXTENSION:  Not identified  REGIONAL LYMPH NODES:  None submitted or identified within specimen  ADDITIONAL FINDINGS:  " Multinodular hyperplasia  PATHOLOGIC STAGE: pT1b pNX       Comment:     Interp By BORIS Restrepo MD, Signed on 07/31/2023 at 11:01       Assessment and Plan:     Problem List Items Addressed This Visit          Oncology    Papillary thyroid carcinoma - Primary    Current Assessment & Plan       Right thyroid lobectomy and isthmusectomy on 7/25/2023.  Doing well from a postoperative standpoint.  Final diagnosis demonstrates 1.3 cm PTC without vascular invasion or other aggressive features.  I have discussed in detail the meaning of this thyroid cancer diagnosis. All questions were answered.  Comes in today with a new palpable spot near her surgical site.  Feels consistent with a stitch knot and she had a silk stitch placed at the time of surgery to stevo the midline.       - Nothing needed to do for the palpable knot as this is most likely the silk stitch that was placed  - To have TSH and US for surveillance, timing her endocrinology  - Follow up PRN            Patient seen with surgery resident, Donny Stern MD.    Jerica Cohen MD  Staff Surgeon  Endocrine Surgery  1/4/24

## 2024-01-04 NOTE — ASSESSMENT & PLAN NOTE
Palpable silk stitch at midline, no feature suspicious for infection or seroma.  Discussed that silk suture is a permanent suture and this knot will likely be palpable forever, however it should not create any long term issues.     - Ongoing biochemical and imaging surveillance, following with Endocrinology  - Follow up PRN

## 2024-02-19 ENCOUNTER — TELEPHONE (OUTPATIENT)
Dept: OBSTETRICS AND GYNECOLOGY | Facility: CLINIC | Age: 49
End: 2024-02-19
Payer: COMMERCIAL

## 2024-02-19 DIAGNOSIS — Z12.31 ENCOUNTER FOR SCREENING MAMMOGRAM FOR MALIGNANT NEOPLASM OF BREAST: Primary | ICD-10-CM

## 2024-02-19 NOTE — TELEPHONE ENCOUNTER
----- Message from Alma Olivas sent at 2/19/2024  4:09 PM CST -----  Name of Who is Calling:CHRISTINA COSTA [[id number]]             What is the request in detail: Pt states she needs an order for a mammogram placed in the system. Please contact to further discuss and advise.              Can the clinic reply by [facility]: No             What Number to Call Back [facility]:   [phone]

## 2024-03-07 ENCOUNTER — HOSPITAL ENCOUNTER (OUTPATIENT)
Dept: RADIOLOGY | Facility: OTHER | Age: 49
Discharge: HOME | End: 2024-03-07
Attending: OBSTETRICS & GYNECOLOGY
Payer: COMMERCIAL

## 2024-03-07 DIAGNOSIS — Z12.31 ENCOUNTER FOR SCREENING MAMMOGRAM FOR MALIGNANT NEOPLASM OF BREAST: ICD-10-CM

## 2024-03-07 PROCEDURE — 77063 BREAST TOMOSYNTHESIS BI: CPT | Mod: 26,,, | Performed by: RADIOLOGY

## 2024-03-07 PROCEDURE — 77067 SCR MAMMO BI INCL CAD: CPT | Mod: TC

## 2024-03-07 PROCEDURE — 77067 SCR MAMMO BI INCL CAD: CPT | Mod: 26,,, | Performed by: RADIOLOGY

## 2024-03-21 ENCOUNTER — OFFICE VISIT (OUTPATIENT)
Dept: OBSTETRICS AND GYNECOLOGY | Facility: CLINIC | Age: 49
End: 2024-03-21
Payer: COMMERCIAL

## 2024-03-21 VITALS
WEIGHT: 137 LBS | HEART RATE: 78 BPM | BODY MASS INDEX: 22.82 KG/M2 | HEIGHT: 65 IN | DIASTOLIC BLOOD PRESSURE: 63 MMHG | SYSTOLIC BLOOD PRESSURE: 98 MMHG

## 2024-03-21 DIAGNOSIS — Z12.31 SCREENING MAMMOGRAM, ENCOUNTER FOR: ICD-10-CM

## 2024-03-21 DIAGNOSIS — N95.1 PERIMENOPAUSAL SYMPTOMS: ICD-10-CM

## 2024-03-21 DIAGNOSIS — C73 PAPILLARY THYROID CARCINOMA: ICD-10-CM

## 2024-03-21 DIAGNOSIS — Z01.419 ENCOUNTER FOR GYNECOLOGICAL EXAMINATION WITHOUT ABNORMAL FINDING: Primary | ICD-10-CM

## 2024-03-21 PROCEDURE — 88175 CYTOPATH C/V AUTO FLUID REDO: CPT | Performed by: PHYSICIAN ASSISTANT

## 2024-03-21 PROCEDURE — 99999 PR PBB SHADOW E&M-EST. PATIENT-LVL III: CPT | Mod: PBBFAC,,, | Performed by: PHYSICIAN ASSISTANT

## 2024-03-21 PROCEDURE — 3074F SYST BP LT 130 MM HG: CPT | Mod: CPTII,S$GLB,, | Performed by: PHYSICIAN ASSISTANT

## 2024-03-21 PROCEDURE — 87624 HPV HI-RISK TYP POOLED RSLT: CPT | Performed by: PHYSICIAN ASSISTANT

## 2024-03-21 PROCEDURE — 99396 PREV VISIT EST AGE 40-64: CPT | Mod: S$GLB,,, | Performed by: PHYSICIAN ASSISTANT

## 2024-03-21 PROCEDURE — 1159F MED LIST DOCD IN RCRD: CPT | Mod: CPTII,S$GLB,, | Performed by: PHYSICIAN ASSISTANT

## 2024-03-21 PROCEDURE — 1160F RVW MEDS BY RX/DR IN RCRD: CPT | Mod: CPTII,S$GLB,, | Performed by: PHYSICIAN ASSISTANT

## 2024-03-21 PROCEDURE — 3008F BODY MASS INDEX DOCD: CPT | Mod: CPTII,S$GLB,, | Performed by: PHYSICIAN ASSISTANT

## 2024-03-21 PROCEDURE — 3078F DIAST BP <80 MM HG: CPT | Mod: CPTII,S$GLB,, | Performed by: PHYSICIAN ASSISTANT

## 2024-03-21 NOTE — PROGRESS NOTES
CC: Well woman exam    Zara Leal is a 48 y.o. female  presents for well woman exam.  LMP: Patient's last menstrual period was 03/15/2024. She stopped her OCP. Periods are regular. She is using condoms for contraception. She would like to continue pap/HPV annually.   She was diagnosed with papillary thyroid cancer last year s/p right thyroid lobectomy. No adjuvant tx recommended. Reports fatigue, weight gain, insomnia and mood swings. She is never sure what is perimenopause and what is thyroid related. Seeing endo and TSH has been stable taking levothyroxine 50mcg daily.    Mammogram: 3/7/2024 TC 17.47 %.   Pap: 2023 negative, HPV negative  PCP: Piotr Villasenor MD  Routine Screening Labs: 2023      Past Medical History:   Diagnosis Date    Endometriosis of pelvis 2005    Goiter     PVC (premature ventricular contraction)      Past Surgical History:   Procedure Laterality Date    COLONOSCOPY N/A 2023    Procedure: COLONOSCOPY;  Surgeon: Callum Boyd MD;  Location: [facility] (4TH FLR);  Service: Endoscopy;  Laterality: N/A;  Instructions to portal. Pt. does not want Anesthesia.EC  23 no answer -eg  23 - Pt r/s - prep ins. on portal - suprep per Pt request - Pt ok with any MD any location- [facility]  pre call , no answer -    HYSTEROSCOPY WITH DILATION AND CURETTAGE OF UTERUS N/A 2019    Procedure: HYSTEROSCOPY, WITH DILATION AND CURETTAGE OF UTERUS;  Surgeon: Maryana Chamberlain MD;  Location: [facility];  Service: OB/GYN;  Laterality: N/A;    THYROID LOBECTOMY Right 2023    Procedure: RIGHT LOBECTOMY, THYROID AND ISTHMUSECTOMY;  Surgeon: Jerica Cohen MD;  Location: [facility] 2ND FLR;  Service: General;  Laterality: Right;    TONSILLECTOMY      uterus septate  2005    WISDOM TOOTH EXTRACTION       Social History     Socioeconomic History    Marital status:    Tobacco Use    Smoking status: Never     Passive exposure: Never    Smokeless tobacco:  "Never   Substance and Sexual Activity    Alcohol use: Not Currently    Drug use: No    Sexual activity: Yes     Partners: Male     Birth control/protection: Condom   Other Topics Concern    Are you pregnant or think you may be? No    Breast-feeding No     Family History   Problem Relation Age of Onset    Melanoma Paternal Grandfather     Breast cancer Paternal Grandmother 80    Melanoma Paternal Grandmother     Melanoma Father     Melanoma Sister     Ovarian cancer Neg Hx     Colon cancer Neg Hx     Cancer Neg Hx      OB History          3    Para   2    Term   2            AB   1    Living   2         SAB   1    IAB        Ectopic        Multiple        Live Births                     Current Outpatient Medications:     acyclovir (ZOVIRAX) 400 MG tablet, 1 po TID  For 3-5 days prn fever blisters, Disp: 30 tablet, Rfl: 11    doxycycline (MONODOX) 100 MG capsule, Take 1 capsule (100 mg total) by mouth 2 (two) times daily., Disp: 20 capsule, Rfl: 0    levothyroxine (SYNTHROID) 50 MCG tablet, Take 1 tablet (50 mcg total) by mouth before breakfast., Disp: 30 tablet, Rfl: 11    norethindrone-ethinyl estradiol (BLISOVI FE ,) 1 mg-20 mcg (21)/75 mg (7) per tablet, Take 1 tablet by mouth once daily., Disp: 30 tablet, Rfl: 7    The ASCVD Risk score (Aravind DK, et al., 2019) failed to calculate for the following reasons:    Cannot find a previous HDL lab    Cannot find a previous total cholesterol lab    BP 98/63   Pulse 78   Ht 5' 5" (1.651 m)   Wt 62.1 kg (137 lb)   LMP 03/15/2024   BMI 22.80 kg/m²       ROS:  GENERAL: +fatigue +weight gain  SKIN: Denies rash or lesions.   HEAD: Denies head injury or headache.   NODES: Denies enlarged lymph nodes.   CHEST: Denies chest pain or shortness of breath.   CARDIOVASCULAR: Denies palpitations or left sided chest pain.   ABDOMEN: No abdominal pain, constipation, diarrhea, nausea, vomiting or rectal bleeding.   URINARY: No frequency, dysuria, hematuria, or " burning on urination.  REPRODUCTIVE: See HPI.   BREASTS: Denies pain, lumps, or nipple discharge.   HEMATOLOGIC: No easy bruisability or excessive bleeding.   MUSCULOSKELETAL: Denies joint pain or swelling.   NEUROLOGIC: Denies syncope or weakness.   PSYCHIATRIC: +mood swings    PHYSICAL EXAM:  APPEARANCE: Well nourished, well developed, in no acute distress.  AFFECT: WNL, alert and oriented x 3  CHEST: Good respiratory effect  ABDOMEN: Soft.  No tenderness or masses.  No hepatosplenomegaly.  No hernias.  BREASTS: Symmetrical, no skin changes or visible lesions.  No palpable masses, nipple discharge bilaterally.  PELVIC: Normal external genitalia without lesions.  Normal hair distribution.  Adequate perineal body, normal urethral meatus.  Vagina moist and well rugated without lesions or discharge.  Cervix pink, without lesions, discharge or tenderness.  No significant cystocele or rectocele.  Bimanual exam shows uterus to be normal size, regular, mobile and nontender.  Adnexa without masses or tenderness.    EXTREMITIES: No edema.    ASSESSMENT:   Encounter for gynecological examination without abnormal finding  -     HPV DNA, High Risk with Reflex to Genotype 16,18  -     Pap Smear, Thin Prep    Perimenopausal symptoms    Papillary thyroid carcinoma    Screening mammogram, encounter for  -     Mammo Digital Screening Bilat w/ Norbert; Future; Expected date: 03/21/2024          PLAN:   Pap/HPV  Annual mammogram  We discussed common symptoms of perimenopause  Hormonal and non-hormonal options were reviewed  Books recommended about menopause per request.  Follow up with endo  Follow up annually or sooner Prn    Patient was counseled today on A.C.S. Pap guidelines and recommendations for yearly pelvic exams, mammograms and monthly self breast exams; to see her PCP for other health maintenance.

## 2024-09-23 ENCOUNTER — PATIENT MESSAGE (OUTPATIENT)
Dept: ENDOCRINOLOGY | Facility: CLINIC | Age: 49
End: 2024-09-23
Payer: COMMERCIAL

## 2024-09-23 DIAGNOSIS — C73 PAPILLARY THYROID CARCINOMA: ICD-10-CM

## 2024-09-23 DIAGNOSIS — E04.1 THYROID NODULE: Primary | ICD-10-CM

## 2024-09-23 NOTE — TELEPHONE ENCOUNTER
Please advise. Lov 9/2023, last TSH was in 10/2023, last u/s was 5/2023. There is already an order on file for the TSH. Not sure if you wanted to order the ultrasound as well.     Maybe one of those quick virtuals for this one?

## 2024-10-03 ENCOUNTER — LAB VISIT (OUTPATIENT)
Dept: LAB | Facility: HOSPITAL | Age: 49
End: 2024-10-03
Attending: INTERNAL MEDICINE
Payer: COMMERCIAL

## 2024-10-03 ENCOUNTER — HOSPITAL ENCOUNTER (OUTPATIENT)
Dept: ENDOCRINOLOGY | Facility: CLINIC | Age: 49
Discharge: HOME | End: 2024-10-03
Attending: INTERNAL MEDICINE
Payer: COMMERCIAL

## 2024-10-03 DIAGNOSIS — E03.9 HYPOTHYROIDISM, UNSPECIFIED TYPE: ICD-10-CM

## 2024-10-03 DIAGNOSIS — C73 PAPILLARY THYROID CARCINOMA: ICD-10-CM

## 2024-10-03 LAB
T4 FREE SERPL-MCNC: 0.89 NG/DL (ref 0.71–1.51)
TSH SERPL DL<=0.005 MIU/L-ACNC: 4.55 UIU/ML (ref 0.4–4)

## 2024-10-03 PROCEDURE — 84439 ASSAY OF FREE THYROXINE: CPT | Performed by: INTERNAL MEDICINE

## 2024-10-03 PROCEDURE — 84432 ASSAY OF THYROGLOBULIN: CPT | Performed by: INTERNAL MEDICINE

## 2024-10-03 PROCEDURE — 36415 COLL VENOUS BLD VENIPUNCTURE: CPT | Performed by: INTERNAL MEDICINE

## 2024-10-03 PROCEDURE — 76536 US EXAM OF HEAD AND NECK: CPT | Mod: S$GLB,,, | Performed by: INTERNAL MEDICINE

## 2024-10-03 PROCEDURE — 84443 ASSAY THYROID STIM HORMONE: CPT | Performed by: INTERNAL MEDICINE

## 2024-10-05 LAB
THRYOGLOBULIN INTERPRETATION: ABNORMAL
THYROGLOB AB SERPL-ACNC: <1.8 IU/ML
THYROGLOB SERPL-MCNC: 14 NG/ML

## 2024-10-07 DIAGNOSIS — E03.9 HYPOTHYROIDISM, UNSPECIFIED TYPE: ICD-10-CM

## 2024-10-07 DIAGNOSIS — C73 PAPILLARY THYROID CARCINOMA: Primary | ICD-10-CM

## 2024-10-07 RX ORDER — LEVOTHYROXINE SODIUM 75 UG/1
75 TABLET ORAL
Qty: 30 TABLET | Refills: 11 | Status: SHIPPED | OUTPATIENT
Start: 2024-10-07 | End: 2025-10-07

## 2024-10-30 ENCOUNTER — OFFICE VISIT (OUTPATIENT)
Dept: ENDOCRINOLOGY | Facility: CLINIC | Age: 49
End: 2024-10-30
Payer: COMMERCIAL

## 2024-10-30 DIAGNOSIS — C73 PAPILLARY THYROID CARCINOMA: Primary | ICD-10-CM

## 2024-10-30 DIAGNOSIS — E03.9 HYPOTHYROIDISM, UNSPECIFIED TYPE: ICD-10-CM

## 2024-10-30 PROCEDURE — 1159F MED LIST DOCD IN RCRD: CPT | Mod: CPTII,95,, | Performed by: INTERNAL MEDICINE

## 2024-10-30 PROCEDURE — G2211 COMPLEX E/M VISIT ADD ON: HCPCS | Mod: 95,,, | Performed by: INTERNAL MEDICINE

## 2024-10-30 PROCEDURE — 99214 OFFICE O/P EST MOD 30 MIN: CPT | Mod: 95,,, | Performed by: INTERNAL MEDICINE

## 2024-10-30 PROCEDURE — 1160F RVW MEDS BY RX/DR IN RCRD: CPT | Mod: CPTII,95,, | Performed by: INTERNAL MEDICINE

## 2024-12-17 ENCOUNTER — OFFICE VISIT (OUTPATIENT)
Dept: URGENT CARE | Facility: CLINIC | Age: 49
End: 2024-12-17
Payer: COMMERCIAL

## 2024-12-17 VITALS
RESPIRATION RATE: 18 BRPM | OXYGEN SATURATION: 96 % | HEIGHT: 65 IN | BODY MASS INDEX: 22.99 KG/M2 | TEMPERATURE: 97 F | DIASTOLIC BLOOD PRESSURE: 66 MMHG | HEART RATE: 77 BPM | SYSTOLIC BLOOD PRESSURE: 102 MMHG | WEIGHT: 138 LBS

## 2024-12-17 DIAGNOSIS — J02.9 SORE THROAT: ICD-10-CM

## 2024-12-17 DIAGNOSIS — R52 BODY ACHES: ICD-10-CM

## 2024-12-17 DIAGNOSIS — J06.9 VIRAL URI WITH COUGH: Primary | ICD-10-CM

## 2024-12-17 LAB
CTP QC/QA: YES
MOLECULAR STREP A: NEGATIVE
POC MOLECULAR INFLUENZA A AGN: NEGATIVE
POC MOLECULAR INFLUENZA B AGN: NEGATIVE
SARS-COV-2 AG RESP QL IA.RAPID: NEGATIVE

## 2024-12-17 PROCEDURE — 87811 SARS-COV-2 COVID19 W/OPTIC: CPT | Mod: QW,S$GLB,, | Performed by: NURSE PRACTITIONER

## 2024-12-17 PROCEDURE — 87502 INFLUENZA DNA AMP PROBE: CPT | Mod: QW,S$GLB,, | Performed by: NURSE PRACTITIONER

## 2024-12-17 PROCEDURE — 99203 OFFICE O/P NEW LOW 30 MIN: CPT | Mod: S$GLB,,, | Performed by: NURSE PRACTITIONER

## 2024-12-17 PROCEDURE — 87651 STREP A DNA AMP PROBE: CPT | Mod: QW,S$GLB,, | Performed by: NURSE PRACTITIONER

## 2024-12-17 RX ORDER — BENZONATATE 200 MG/1
200 CAPSULE ORAL 3 TIMES DAILY PRN
Qty: 30 CAPSULE | Refills: 0 | Status: SHIPPED | OUTPATIENT
Start: 2024-12-17 | End: 2024-12-27

## 2024-12-17 NOTE — PROGRESS NOTES
"Subjective:      Patient ID: Zara Leal is a 49 y.o. female.    Vitals:  height is 5' 5" (1.651 m) and weight is 62.6 kg (138 lb). Her oral temperature is 97.2 °F (36.2 °C). Her blood pressure is 102/66 and her pulse is 77. Her respiration is 18 and oxygen saturation is 96%.     Chief Complaint: Sore Throat (Entered by patient)    Patient presents with sore throat, runny nose and congestion. Onset 2 days   Provider note begins below    Patient reports waking up with body aches.  She has a dry irritated cough.  Her son is sick with similar symptoms.  No fevers.  No test taken.    Sore Throat   Associated symptoms include coughing. Pertinent negatives include no diarrhea or vomiting.     Constitution: Positive for fatigue. Negative for fever.   HENT:  Positive for sore throat.    Cardiovascular:  Negative for chest pain and sob on exertion.   Respiratory:  Positive for cough. Negative for sputum production.    Gastrointestinal:  Negative for nausea, vomiting, constipation and diarrhea.   Musculoskeletal:  Positive for muscle ache.      Objective:     Physical Exam   Constitutional: She is oriented to person, place, and time.   HENT:   Head: Normocephalic and atraumatic.   Nose: Rhinorrhea present.   Mouth/Throat: No posterior oropharyngeal erythema.   Cardiovascular: Normal rate and regular rhythm.   Pulmonary/Chest: Effort normal and breath sounds normal. No stridor. No respiratory distress. She has no wheezes. She has no rhonchi. She has no rales. She exhibits no tenderness.   Abdominal: Normal appearance.   Neurological: She is alert and oriented to person, place, and time.   Skin: Skin is warm and dry.   Psychiatric: Her behavior is normal. Mood normal.         Results for orders placed or performed in visit on 12/17/24   POCT Strep A, Molecular    Collection Time: 12/17/24  9:01 AM   Result Value Ref Range    Molecular Strep A, POC Negative Negative     Acceptable Yes    POCT " Influenza A/B MOLECULAR    Collection Time: 12/17/24  9:28 AM   Result Value Ref Range    POC Molecular Influenza A Ag Negative Negative    POC Molecular Influenza B Ag Negative Negative     Acceptable Yes    SARS Coronavirus 2 Antigen, POCT Manual Read    Collection Time: 12/17/24  9:43 AM   Result Value Ref Range    SARS Coronavirus 2 Antigen Negative Negative     Acceptable Yes       Assessment:     1. Viral URI with cough    2. Sore throat    3. Body aches        Plan:   Strep test negative  Flu test negative  COVID test negative  Tessalon Perles for cough   Follow up if not improving           Viral URI with cough  -     benzonatate (TESSALON) 200 MG capsule; Take 1 capsule (200 mg total) by mouth 3 (three) times daily as needed for Cough.  Dispense: 30 capsule; Refill: 0    Sore throat  -     POCT Strep A, Molecular  -     POCT Influenza A/B MOLECULAR  -     SARS Coronavirus 2 Antigen, POCT Manual Read    Body aches  -     POCT Influenza A/B MOLECULAR  -     SARS Coronavirus 2 Antigen, POCT Manual Read

## 2024-12-22 ENCOUNTER — TELEPHONE (OUTPATIENT)
Dept: FAMILY MEDICINE | Facility: CLINIC | Age: 49
End: 2024-12-22
Payer: COMMERCIAL

## 2024-12-22 RX ORDER — AZITHROMYCIN 250 MG/1
TABLET, FILM COATED ORAL
Qty: 6 TABLET | Refills: 0 | Status: SHIPPED | OUTPATIENT
Start: 2024-12-22

## 2024-12-22 RX ORDER — PREDNISONE 20 MG/1
40 TABLET ORAL DAILY
Qty: 8 TABLET | Refills: 0 | Status: SHIPPED | OUTPATIENT
Start: 2024-12-22 | End: 2024-12-26

## 2024-12-27 RX ORDER — OSELTAMIVIR PHOSPHATE 75 MG/1
75 CAPSULE ORAL DAILY
Qty: 10 CAPSULE | Refills: 0 | Status: SHIPPED | OUTPATIENT
Start: 2024-12-27 | End: 2025-01-06

## 2025-01-16 ENCOUNTER — TELEPHONE (OUTPATIENT)
Dept: OBSTETRICS AND GYNECOLOGY | Facility: CLINIC | Age: 50
End: 2025-01-16
Payer: COMMERCIAL

## 2025-01-16 NOTE — TELEPHONE ENCOUNTER
Pt called to see about getting hormone levels because her endocrinologist is trying to get her thyroid straight. Advised pt is she wants to come in to talk about hormone I can get her scheduled with our Access navigator. Pt scheduled

## 2025-01-16 NOTE — TELEPHONE ENCOUNTER
----- Message from Amelia sent at 1/16/2025 10:23 AM CST -----  Regarding: concerns  Name of Who is Calling:            What is the request in detail: Patient is requesting a call back in regards to some blood test she may need done.            Can the clinic reply by [facility]: Yes           What Number to Call Back [facility] not [facility]: [phone]

## 2025-01-24 ENCOUNTER — CLINICAL SUPPORT (OUTPATIENT)
Dept: OBSTETRICS AND GYNECOLOGY | Facility: CLINIC | Age: 50
End: 2025-01-24

## 2025-01-24 DIAGNOSIS — N95.1 MENOPAUSAL SYMPTOMS: Primary | ICD-10-CM

## 2025-01-24 NOTE — PROGRESS NOTES
Personal Information    Full Name: Zara Leal  1975    PCP- Dr. Piotr Villasenor       Medical History    Do you have a chronic medical condition? (e.g., diabetes, hypertension, thyroid issues)   If yes, please specify:   Yes - Thyroid Cancer -- Right Nodule  removed 2003    2.   Do you have a history of hormone- related conditions? (e.g., endometriosis, breast cancer)   If yes, please explain:   Yes - Endometriosis    3.   Have you previously or are you currently taking hormone replacement therapy?   If yes, please list:   No    Menstrual History    At what age did you begin menstruating?   15    2.   Have you experienced any changes in your menstrual cycle in the last year?   If yes, please describe:   Yes - Cycles are veery 2-3 weeks , with a heavy to light flow. D&C Hysteroscope 12/2005    3.   When was your last menstrual period or age of last period?   1/21/2025    4.   Have you had a hysterectomy?   If yes, at what age?  No    Symptoms Assesments      Are you experiencing any of the following symptoms:  Hot Flashes: No   Night Sweats: No   Vaginal Dryness or Pain with [place]: No   Mood Swings: Yes   Anxiety or Depression: Yes   Sleep Disturbances: Yes   Fatigue: Yes   Weight Gain: Yes   Joint or Muscle Pain: Yes   Memory Issues or Brain Fog: Yes   Decreased Interest in Sex (Low Libido): Yes  Colonoscopy Yes - 6/2023 negative      Lifestyle Factors    How would you describe your diet?  Balanced    2.   How often do you exercise?   Rarely    3.   Do you smoke or consume alcohol?   If yes, please specify frequency:   No    4.   How would you rate your stress levels?   High    Family History    Is there a family history of menopause-related conditions? (e.g., osteoporosis, breast cancer)  If yes, please specify  Yes - Paternal GM Breast & Uterine Ca (age 80)    2.   Is there a family history of heart disease?   If yes, please specify   Yes - Maternal GP heart Disease             Spoke  with patient for a total of 30 minutes during virtual visit.  Patient was guided through expectations and treatment options.     Questions answered. Encouraged to send message or call office with any questions/concerns. Verbalized understanding.       Maureen

## 2025-01-25 ENCOUNTER — LAB VISIT (OUTPATIENT)
Dept: LAB | Facility: HOSPITAL | Age: 50
End: 2025-01-25

## 2025-01-25 DIAGNOSIS — E03.9 HYPOTHYROIDISM, UNSPECIFIED TYPE: ICD-10-CM

## 2025-01-25 LAB — TSH SERPL DL<=0.005 MIU/L-ACNC: 3.81 UIU/ML (ref 0.4–4)

## 2025-01-25 PROCEDURE — 36415 COLL VENOUS BLD VENIPUNCTURE: CPT | Performed by: INTERNAL MEDICINE

## 2025-01-25 PROCEDURE — 84443 ASSAY THYROID STIM HORMONE: CPT | Performed by: INTERNAL MEDICINE

## 2025-01-27 ENCOUNTER — PATIENT MESSAGE (OUTPATIENT)
Dept: ENDOCRINOLOGY | Facility: CLINIC | Age: 50
End: 2025-01-27

## 2025-01-27 DIAGNOSIS — E03.9 HYPOTHYROIDISM, UNSPECIFIED TYPE: ICD-10-CM

## 2025-01-27 DIAGNOSIS — E04.1 THYROID NODULE: Primary | ICD-10-CM

## 2025-01-28 RX ORDER — LEVOTHYROXINE SODIUM 88 UG/1
88 TABLET ORAL
Qty: 30 TABLET | Refills: 11 | Status: SHIPPED | OUTPATIENT
Start: 2025-01-28 | End: 2026-01-28

## 2025-03-20 ENCOUNTER — HOSPITAL ENCOUNTER (OUTPATIENT)
Dept: RADIOLOGY | Facility: HOSPITAL | Age: 50
Discharge: HOME | End: 2025-03-20
Attending: PHYSICIAN ASSISTANT
Payer: COMMERCIAL

## 2025-03-20 VITALS — BODY MASS INDEX: 22.99 KG/M2 | HEIGHT: 65 IN | WEIGHT: 138 LBS

## 2025-03-20 DIAGNOSIS — Z12.31 SCREENING MAMMOGRAM, ENCOUNTER FOR: ICD-10-CM

## 2025-03-20 PROCEDURE — 77067 SCR MAMMO BI INCL CAD: CPT | Mod: TC

## 2025-03-24 ENCOUNTER — RESULTS FOLLOW-UP (OUTPATIENT)
Dept: OBSTETRICS AND GYNECOLOGY | Facility: CLINIC | Age: 50
End: 2025-03-24

## 2025-04-09 ENCOUNTER — TELEPHONE (OUTPATIENT)
Dept: OBSTETRICS AND GYNECOLOGY | Facility: CLINIC | Age: 50
End: 2025-04-09
Payer: COMMERCIAL

## 2025-04-09 NOTE — TELEPHONE ENCOUNTER
Spoke with patient and rescheduled consult appointment. Patient has no further questions or complaints.

## 2025-04-09 NOTE — TELEPHONE ENCOUNTER
----- Message from Hannah sent at 4/9/2025 10:14 AM CDT -----  Patient called wanting reschedule her appt for tomorrow. Patient has an appt for next week on the 15th as well and wondered if she can combine these appts. She can be reached at [phone]

## 2025-04-15 ENCOUNTER — OFFICE VISIT (OUTPATIENT)
Dept: OBSTETRICS AND GYNECOLOGY | Facility: CLINIC | Age: 50
End: 2025-04-15
Payer: COMMERCIAL

## 2025-04-15 VITALS
WEIGHT: 130.19 LBS | HEIGHT: 65 IN | HEART RATE: 72 BPM | SYSTOLIC BLOOD PRESSURE: 93 MMHG | BODY MASS INDEX: 21.69 KG/M2 | DIASTOLIC BLOOD PRESSURE: 60 MMHG

## 2025-04-15 DIAGNOSIS — Z12.4 PAP SMEAR FOR CERVICAL CANCER SCREENING: ICD-10-CM

## 2025-04-15 DIAGNOSIS — N95.1 PERIMENOPAUSAL SYMPTOMS: ICD-10-CM

## 2025-04-15 DIAGNOSIS — Z01.419 ENCOUNTER FOR GYNECOLOGICAL EXAMINATION WITHOUT ABNORMAL FINDING: Primary | ICD-10-CM

## 2025-04-15 DIAGNOSIS — Z00.00 WELLNESS EXAMINATION: ICD-10-CM

## 2025-04-15 DIAGNOSIS — Z12.31 VISIT FOR SCREENING MAMMOGRAM: ICD-10-CM

## 2025-04-15 PROCEDURE — 99999 PR PBB SHADOW E&M-EST. PATIENT-LVL III: CPT | Mod: PBBFAC,,, | Performed by: PHYSICIAN ASSISTANT

## 2025-04-15 NOTE — PROGRESS NOTES
CC: Well woman exam    Zara Leal is a 49 y.o. female  presents for well woman exam.  LMP: Patient's last menstrual period was 2025.. Periods have been irregular. She did not have a period from 2024-3/21/2025. She is using condoms for contraception. She is having multiple perimenopausal symptoms. HRT consult has been scheduled. She would like to continue pap/HPV annually.   History of papillary thyroid cancer last year s/p right thyroid lobectomy on 2023. No adjuvant tx recommended.      Mammogram: 3/20/2025  Pap: 3/21/2024 negative, HPV negative  PCP:  Piotr Villasenor MD   Routine Screening Labs: 8/15/2019  Colonoscopy: 2023 repeat in 10 years      Past Medical History:   Diagnosis Date    Endometriosis of pelvis 2005    Goiter     PVC (premature ventricular contraction)      Past Surgical History:   Procedure Laterality Date    COLONOSCOPY N/A 2023    Procedure: COLONOSCOPY;  Surgeon: Callum Boyd MD;  Location: [facility] (4TH FLR);  Service: Endoscopy;  Laterality: N/A;  Instructions to portal. Pt. does not want Anesthesia.EC  23 no answer -eg  23 - Pt r/s - prep ins. on portal - suprep per Pt request - Pt ok with any MD any location- [facility]  pre call , no answer -    HYSTEROSCOPY WITH DILATION AND CURETTAGE OF UTERUS N/A 2019    Procedure: HYSTEROSCOPY, WITH DILATION AND CURETTAGE OF UTERUS;  Surgeon: Maryana Chamberlain MD;  Location: [facility];  Service: OB/GYN;  Laterality: N/A;    THYROID LOBECTOMY Right 2023    Procedure: RIGHT LOBECTOMY, THYROID AND ISTHMUSECTOMY;  Surgeon: Jerica Cohen MD;  Location: [facility] 2ND FLR;  Service: General;  Laterality: Right;    TONSILLECTOMY      uterus septate  2005    WISDOM TOOTH EXTRACTION       Social History[1]  Family History   Problem Relation Name Age of Onset    Melanoma Paternal Grandfather      Breast cancer Paternal Grandmother  80    Melanoma Paternal Grandmother      Melanoma Father  "     Melanoma Sister      Ovarian cancer Neg Hx      Colon cancer Neg Hx      Cancer Neg Hx       OB History          3    Para   2    Term   2            AB   1    Living   2         SAB   1    IAB        Ectopic        Multiple        Live Births                   Current Medications[2]    The ASCVD Risk score (Arvaind MCMULLEN, et al., 2019) failed to calculate for the following reasons:    Cannot find a previous HDL lab    Cannot find a previous total cholesterol lab    BP 93/60 (BP Location: Right arm, Patient Position: Sitting)   Pulse 72   Ht 5' 5" (1.651 m)   Wt 59.1 kg (130 lb 3.2 oz)   LMP 2025   BMI 21.67 kg/m²       ROS:  GENERAL: Denies weight gain or weight loss. Feeling well overall.   SKIN: Denies rash or lesions.   HEAD: Denies head injury or headache.   NODES: Denies enlarged lymph nodes.   CHEST: Denies chest pain or shortness of breath.   CARDIOVASCULAR: Denies palpitations or left sided chest pain.   ABDOMEN: No abdominal pain, constipation, diarrhea, nausea, vomiting or rectal bleeding.   URINARY: No frequency, dysuria, hematuria, or burning on urination.  REPRODUCTIVE: See HPI.   BREASTS: Denies pain, lumps, or nipple discharge.   HEMATOLOGIC: No easy bruisability or excessive bleeding.   MUSCULOSKELETAL: Denies joint pain or swelling.   NEUROLOGIC: Denies syncope or weakness.   PSYCHIATRIC: Denies depression, anxiety or mood swings.    PHYSICAL EXAM:  APPEARANCE: Well nourished, well developed, in no acute distress.  AFFECT: WNL, alert and oriented x 3  SKIN: No acne or hirsutism  NECK: Neck symmetric without masses or thyromegaly  NODES: No inguinal, cervical, axillary, or femoral lymph node enlargement  CHEST: Good respiratory effect  ABDOMEN: Soft.  No tenderness or masses.  No hepatosplenomegaly.  No hernias.  BREASTS: Symmetrical, no skin changes or visible lesions.  No palpable masses, nipple discharge bilaterally.  PELVIC: Normal external genitalia without lesions.  " Normal hair distribution.  Adequate perineal body, normal urethral meatus.  Vagina moist and well rugated without lesions or discharge.  Cervix pink, without lesions, discharge or tenderness.  No significant cystocele or rectocele.  Bimanual exam shows uterus to be normal size, regular, mobile and nontender.  Adnexa without masses or tenderness.    EXTREMITIES: No edema.    ASSESSMENT:   Encounter for gynecological examination without abnormal finding    Visit for screening mammogram  -     Mammo Digital Screening Bilat w/ Norbert (XPD); Future; Expected date: 04/15/2025    Pap smear for cervical cancer screening  -     Liquid-Based Pap Smear, Screening    Perimenopausal symptoms  -     Estradiol; Future; Expected date: 04/15/2025  -     Testosterone, free; Future; Expected date: 04/15/2025  -     Testosterone; Future; Expected date: 04/15/2025  -     Progesterone; Future; Expected date: 04/15/2025  -     Follicle Stimulating Hormone; Future; Expected date: 04/15/2025    Wellness examination  -     CBC Auto Differential; Future; Expected date: 04/15/2025  -     Comprehensive Metabolic Panel; Future; Expected date: 04/15/2025  -     Lipid panel; Future; Expected date: 04/15/2025          PLAN:   Pap/HPV  Annual screening mammogram  Baseline hormone levels and routine screening labs scheduled  Discussed fluctuation of hormone levels in perimenopause  HRT consult scheduled  Follow up annually for WWE or PRN    Patient was counseled today on A.C.S. Pap guidelines and recommendations for yearly pelvic exams, mammograms and monthly self breast exams; to see her PCP for other health maintenance.                              [1]   Social History  Socioeconomic History    Marital status:    Tobacco Use    Smoking status: Never     Passive exposure: Never    Smokeless tobacco: Never   Substance and Sexual Activity    Alcohol use: Not Currently    Drug use: No    Sexual activity: Yes     Partners: Male     Birth  control/protection: Condom   Other Topics Concern    Are you pregnant or think you may be? No    Breast-feeding No     Social Drivers of Health     Financial Resource Strain: Patient Declined (10/3/2024)    Overall Financial Resource Strain (CARDIA)     Difficulty of Paying Living Expenses: Patient declined   Food Insecurity: Patient Declined (10/3/2024)    Hunger Vital Sign     Worried About Running Out of Food in the Last Year: Patient declined     Ran Out of Food in the Last Year: Patient declined   Physical Activity: Unknown (10/3/2024)    Exercise Vital Sign     Days of Exercise per Week: 0 days   Stress: Stress Concern Present (10/3/2024)    [employer] - Occupational Stress Questionnaire     Feeling of Stress : To some extent   Housing Stability: Unknown (10/3/2024)    Housing Stability Vital Sign     Unable to Pay for Housing in the Last Year: Patient declined   [2]   Current Outpatient Medications:     acyclovir (ZOVIRAX) 400 MG tablet, 1 po TID  For 3-5 days prn fever blisters (Patient not taking: Reported on 4/15/2025), Disp: 30 tablet, Rfl: 11    levothyroxine (SYNTHROID) 88 MCG tablet, Take 1 tablet (88 mcg total) by mouth before breakfast., Disp: 30 tablet, Rfl: 11

## 2025-04-25 ENCOUNTER — RESULTS FOLLOW-UP (OUTPATIENT)
Dept: OBSTETRICS AND GYNECOLOGY | Facility: CLINIC | Age: 50
End: 2025-04-25

## 2025-06-18 ENCOUNTER — TELEPHONE (OUTPATIENT)
Dept: FAMILY MEDICINE | Facility: CLINIC | Age: 50
End: 2025-06-18
Payer: COMMERCIAL

## 2025-06-18 ENCOUNTER — TELEPHONE (OUTPATIENT)
Dept: SURGERY | Facility: CLINIC | Age: 50
End: 2025-06-18
Payer: COMMERCIAL

## 2025-06-18 DIAGNOSIS — K62.89 ANAL PAIN: Primary | ICD-10-CM

## 2025-06-18 RX ORDER — COCOA BUTTER, PHENYLEPHRINE HYDROCHLORIDE 2211; 6.25 MG/1; MG/1
1 SUPPOSITORY RECTAL 4 TIMES DAILY PRN
Qty: 12 SUPPOSITORY | Refills: 3 | Status: SHIPPED | OUTPATIENT
Start: 2025-06-18

## 2025-06-18 NOTE — TELEPHONE ENCOUNTER
RN called pt back.  Pt has a referral for anal pain.  Pt was already told by call center that next avail appt was in Aug.  Before placing pt on Aug 4 next avail, RN messaged Nurse Serra asking to get pt in sooner.  RN let pt know that she can be placed on the wait list and sometimes we have cancellations. Pt will be contacted by the end of the week with an update.  Nurse Serra will be back on 6/20.  Pt verbalized understanding to all.

## 2025-06-18 NOTE — TELEPHONE ENCOUNTER
Call - left internal anal pain coming more often TID like a spasm and now more often also dull. No bleeding a=or bowel change, no hard bowels etc.     Progressive over 3 wks or so, new    Int roid? Anal spasm?    Try prep H supp? Watch for sudafed side effects. Also best to see CRS and she agrees.

## 2025-06-20 ENCOUNTER — TELEPHONE (OUTPATIENT)
Dept: SURGERY | Facility: CLINIC | Age: 50
End: 2025-06-20
Payer: COMMERCIAL

## 2025-06-20 NOTE — TELEPHONE ENCOUNTER
RN called pt to offer her a sooner appt with our NP team for 6/25 at 1:30pm with JUDITH Gibbs NP.  RN left pt our office number to call back and confirm.  Sooner appt not available with Dr. Thompson at this time.

## 2025-06-23 ENCOUNTER — TELEPHONE (OUTPATIENT)
Dept: SURGERY | Facility: CLINIC | Age: 50
End: 2025-06-23
Payer: COMMERCIAL

## 2025-06-23 NOTE — TELEPHONE ENCOUNTER
Attempt number #2.  RN called pt to offer her a sooner appt with our NP team for 6/25 at 1:30pm with JUDITH Gibbs NP.  RN left pt our office number to call back and confirm. Sooner appt not available with Dr. Thompson at this time.

## 2025-07-03 ENCOUNTER — TELEPHONE (OUTPATIENT)
Dept: SURGERY | Facility: CLINIC | Age: 50
End: 2025-07-03
Payer: COMMERCIAL

## 2025-08-27 ENCOUNTER — PATIENT MESSAGE (OUTPATIENT)
Dept: OBSTETRICS AND GYNECOLOGY | Facility: CLINIC | Age: 50
End: 2025-08-27
Payer: COMMERCIAL

## 2025-08-29 ENCOUNTER — LAB VISIT (OUTPATIENT)
Dept: LAB | Facility: OTHER | Age: 50
End: 2025-08-29
Attending: PHYSICIAN ASSISTANT
Payer: COMMERCIAL

## 2025-08-29 ENCOUNTER — OFFICE VISIT (OUTPATIENT)
Dept: OBSTETRICS AND GYNECOLOGY | Facility: CLINIC | Age: 50
End: 2025-08-29
Payer: COMMERCIAL

## 2025-08-29 VITALS
DIASTOLIC BLOOD PRESSURE: 65 MMHG | WEIGHT: 135 LBS | HEIGHT: 65 IN | HEART RATE: 81 BPM | BODY MASS INDEX: 22.49 KG/M2 | SYSTOLIC BLOOD PRESSURE: 92 MMHG

## 2025-08-29 DIAGNOSIS — N92.1 MENORRHAGIA WITH IRREGULAR CYCLE: ICD-10-CM

## 2025-08-29 DIAGNOSIS — N95.1 PERIMENOPAUSAL SYMPTOMS: Primary | ICD-10-CM

## 2025-08-29 DIAGNOSIS — N95.1 PERIMENOPAUSAL SYMPTOMS: ICD-10-CM

## 2025-08-29 LAB
ABSOLUTE EOSINOPHIL (OHS): 0.12 K/UL
ABSOLUTE MONOCYTE (OHS): 0.32 K/UL (ref 0.3–1)
ABSOLUTE NEUTROPHIL COUNT (OHS): 3.63 K/UL (ref 1.8–7.7)
ALBUMIN SERPL BCP-MCNC: 4.3 G/DL (ref 3.5–5.2)
ALP SERPL-CCNC: 49 UNIT/L (ref 40–150)
ALT SERPL W/O P-5'-P-CCNC: 10 UNIT/L (ref 10–44)
ANION GAP (OHS): 7 MMOL/L (ref 8–16)
AST SERPL-CCNC: 22 UNIT/L (ref 11–45)
BASOPHILS # BLD AUTO: 0.02 K/UL
BASOPHILS NFR BLD AUTO: 0.4 %
BILIRUB SERPL-MCNC: 0.3 MG/DL (ref 0.1–1)
BUN SERPL-MCNC: 13 MG/DL (ref 6–20)
CALCIUM SERPL-MCNC: 8.8 MG/DL (ref 8.7–10.5)
CHLORIDE SERPL-SCNC: 107 MMOL/L (ref 95–110)
CO2 SERPL-SCNC: 27 MMOL/L (ref 23–29)
CREAT SERPL-MCNC: 0.8 MG/DL (ref 0.5–1.4)
ERYTHROCYTE [DISTWIDTH] IN BLOOD BY AUTOMATED COUNT: 15.4 % (ref 11.5–14.5)
ESTRADIOL SERPL HS-MCNC: 22 PG/ML
FERRITIN SERPL-MCNC: 5 NG/ML (ref 20–300)
FSH SERPL-ACNC: 14.1 MIU/ML
GFR SERPLBLD CREATININE-BSD FMLA CKD-EPI: >60 ML/MIN/1.73/M2
GLUCOSE SERPL-MCNC: 96 MG/DL (ref 70–110)
HCT VFR BLD AUTO: 34.9 % (ref 37–48.5)
HGB BLD-MCNC: 11 GM/DL (ref 12–16)
IMM GRANULOCYTES # BLD AUTO: 0.02 K/UL (ref 0–0.04)
IMM GRANULOCYTES NFR BLD AUTO: 0.4 % (ref 0–0.5)
IRON SATN MFR SERPL: 5 % (ref 20–50)
IRON SERPL-MCNC: 24 UG/DL (ref 30–160)
LYMPHOCYTES # BLD AUTO: 1.56 K/UL (ref 1–4.8)
MCH RBC QN AUTO: 25.5 PG (ref 27–31)
MCHC RBC AUTO-ENTMCNC: 31.5 G/DL (ref 32–36)
MCV RBC AUTO: 81 FL (ref 82–98)
NUCLEATED RBC (/100WBC) (OHS): 0 /100 WBC
PLATELET # BLD AUTO: 215 K/UL (ref 150–450)
PMV BLD AUTO: 8.8 FL (ref 9.2–12.9)
POTASSIUM SERPL-SCNC: 3.9 MMOL/L (ref 3.5–5.1)
PROGEST SERPL-MCNC: 0.5 NG/ML
PROT SERPL-MCNC: 6.7 GM/DL (ref 6–8.4)
RBC # BLD AUTO: 4.31 M/UL (ref 4–5.4)
RELATIVE EOSINOPHIL (OHS): 2.1 %
RELATIVE LYMPHOCYTE (OHS): 27.5 % (ref 18–48)
RELATIVE MONOCYTE (OHS): 5.6 % (ref 4–15)
RELATIVE NEUTROPHIL (OHS): 64 % (ref 38–73)
SODIUM SERPL-SCNC: 141 MMOL/L (ref 136–145)
T4 FREE SERPL-MCNC: 1.14 NG/DL (ref 0.71–1.51)
T4 FREE SERPL-MCNC: 1.14 NG/DL (ref 0.71–1.51)
TESTOST SERPL-MCNC: 16 NG/DL (ref 5–73)
TIBC SERPL-MCNC: 466 UG/DL (ref 250–450)
TRANSFERRIN SERPL-MCNC: 315 MG/DL (ref 200–375)
TSH SERPL-ACNC: 1.57 UIU/ML (ref 0.4–4)
WBC # BLD AUTO: 5.67 K/UL (ref 3.9–12.7)

## 2025-08-29 PROCEDURE — 36415 COLL VENOUS BLD VENIPUNCTURE: CPT

## 2025-08-29 PROCEDURE — 84443 ASSAY THYROID STIM HORMONE: CPT

## 2025-08-29 PROCEDURE — 99999 PR PBB SHADOW E&M-EST. PATIENT-LVL III: CPT | Mod: PBBFAC,,, | Performed by: PHYSICIAN ASSISTANT

## 2025-08-29 PROCEDURE — 82670 ASSAY OF TOTAL ESTRADIOL: CPT

## 2025-08-29 PROCEDURE — 84403 ASSAY OF TOTAL TESTOSTERONE: CPT

## 2025-08-29 PROCEDURE — 85025 COMPLETE CBC W/AUTO DIFF WBC: CPT

## 2025-08-29 PROCEDURE — 80053 COMPREHEN METABOLIC PANEL: CPT

## 2025-08-29 PROCEDURE — 82728 ASSAY OF FERRITIN: CPT

## 2025-08-29 PROCEDURE — 83001 ASSAY OF GONADOTROPIN (FSH): CPT

## 2025-08-29 PROCEDURE — 83540 ASSAY OF IRON: CPT

## 2025-08-29 PROCEDURE — 84144 ASSAY OF PROGESTERONE: CPT

## 2025-08-29 RX ORDER — PROGESTERONE 100 MG/1
100 CAPSULE ORAL NIGHTLY
Qty: 30 CAPSULE | Refills: 11 | Status: SHIPPED | OUTPATIENT
Start: 2025-08-29 | End: 2026-08-29

## 2025-09-01 ENCOUNTER — PATIENT MESSAGE (OUTPATIENT)
Dept: FAMILY MEDICINE | Facility: CLINIC | Age: 50
End: 2025-09-01
Payer: COMMERCIAL

## 2025-09-02 DIAGNOSIS — N95.1 PERIMENOPAUSAL SYMPTOMS: ICD-10-CM

## 2025-09-02 RX ORDER — ACYCLOVIR 400 MG/1
TABLET ORAL
Qty: 30 TABLET | Refills: 11 | Status: SHIPPED | OUTPATIENT
Start: 2025-09-02

## 2025-09-03 RX ORDER — PROGESTERONE 100 MG/1
100 CAPSULE ORAL NIGHTLY
Qty: 30 CAPSULE | Refills: 11 | Status: SHIPPED | OUTPATIENT
Start: 2025-09-03

## (undated) DEVICE — SUT VICRYL 3-0 27 SH

## (undated) DEVICE — DRAPE EENT SPLIT STERILE

## (undated) DEVICE — SUT VICRYL 6-0 P1 18IN UD

## (undated) DEVICE — TRAY MINOR GEN SURG OMC

## (undated) DEVICE — SEE MEDLINE ITEM 146313

## (undated) DEVICE — SET INFLOW TUBE HYSTER

## (undated) DEVICE — SOL IRR NACL .9% 3000ML

## (undated) DEVICE — CLIP LIGACLIP XTRA TITANIUM

## (undated) DEVICE — CHLORAPREP 10.5 ML APPLICATOR

## (undated) DEVICE — DRAPE HALF SURGICAL 40X58IN

## (undated) DEVICE — Device

## (undated) DEVICE — DRAPE CORETEMP FLD WRM 56X62IN

## (undated) DEVICE — CONTAINER SPECIMEN OR STER 4OZ

## (undated) DEVICE — ELECTRODE BLADE INSULATED 1 IN

## (undated) DEVICE — CORD BIPOLAR 12 FOOT

## (undated) DEVICE — PROBE SIMULATOR KRAFF

## (undated) DEVICE — DEVICE TRUECLEAR INCISOR 2.9

## (undated) DEVICE — SEE MEDLINE ITEM 157194

## (undated) DEVICE — KIT WING PAD POSITIONING

## (undated) DEVICE — SUT 4-0 12-18IN SILK BLACK

## (undated) DEVICE — SUT 3-0 12-18IN SILK

## (undated) DEVICE — SOL 9P NACL IRR PIC IL

## (undated) DEVICE — MARKER SKIN STND TIP BLUE BARR

## (undated) DEVICE — GAUZE SPONGE PEANUT STRL

## (undated) DEVICE — PAD CURAD NONADH 3X4IN

## (undated) DEVICE — DRESSING TRANS 4X4 TEGADERM

## (undated) DEVICE — SET BASIN 48X48IN 6000ML RING

## (undated) DEVICE — JELLY SURGILUBE 5GR

## (undated) DEVICE — DRAPE STERI INSTRUMENT 1018

## (undated) DEVICE — SUT LIGACLIP SMALL XTRA

## (undated) DEVICE — SUT 2/0 30IN SILK BLK BRAI

## (undated) DEVICE — PAD PERI POST REPLACEMNT

## (undated) DEVICE — GLOVE BIOGEL SKINSENSE PI 6.0

## (undated) DEVICE — NDL HYPO REG 25G X 1 1/2

## (undated) DEVICE — ELECTRODE REM PLYHSV RETURN 9

## (undated) DEVICE — GLOVE BIOGEL SKINSENSE PI 6.5

## (undated) DEVICE — ADHESIVE DERMABOND ADVANCED